# Patient Record
Sex: FEMALE | Race: WHITE | NOT HISPANIC OR LATINO | Employment: OTHER | URBAN - METROPOLITAN AREA
[De-identification: names, ages, dates, MRNs, and addresses within clinical notes are randomized per-mention and may not be internally consistent; named-entity substitution may affect disease eponyms.]

---

## 2023-08-20 LAB
ANION GAP IN SER/PLAS: 15 MMOL/L (ref 10–20)
BASOPHILS (10*3/UL) IN BLOOD BY AUTOMATED COUNT: 0.07 X10E9/L (ref 0–0.1)
BASOPHILS/100 LEUKOCYTES IN BLOOD BY AUTOMATED COUNT: 1.1 % (ref 0–2)
CALCIUM (MG/DL) IN SER/PLAS: 8.8 MG/DL (ref 8.6–10.3)
CARBON DIOXIDE, TOTAL (MMOL/L) IN SER/PLAS: 25 MMOL/L (ref 21–32)
CHLORIDE (MMOL/L) IN SER/PLAS: 102 MMOL/L (ref 98–107)
CREATININE (MG/DL) IN SER/PLAS: 0.96 MG/DL (ref 0.5–1.05)
EOSINOPHILS (10*3/UL) IN BLOOD BY AUTOMATED COUNT: 0.31 X10E9/L (ref 0–0.4)
EOSINOPHILS/100 LEUKOCYTES IN BLOOD BY AUTOMATED COUNT: 4.9 % (ref 0–6)
ERYTHROCYTE DISTRIBUTION WIDTH (RATIO) BY AUTOMATED COUNT: 14.8 % (ref 11.5–14.5)
ERYTHROCYTE MEAN CORPUSCULAR HEMOGLOBIN CONCENTRATION (G/DL) BY AUTOMATED: 30.5 G/DL (ref 32–36)
ERYTHROCYTE MEAN CORPUSCULAR VOLUME (FL) BY AUTOMATED COUNT: 87 FL (ref 80–100)
ERYTHROCYTES (10*6/UL) IN BLOOD BY AUTOMATED COUNT: 5.11 X10E12/L (ref 4–5.2)
GFR FEMALE: 60 ML/MIN/1.73M2
GLUCOSE (MG/DL) IN SER/PLAS: 99 MG/DL (ref 74–99)
HEMATOCRIT (%) IN BLOOD BY AUTOMATED COUNT: 44.3 % (ref 36–46)
HEMOGLOBIN (G/DL) IN BLOOD: 13.5 G/DL (ref 12–16)
IMMATURE GRANULOCYTES/100 LEUKOCYTES IN BLOOD BY AUTOMATED COUNT: 0.6 % (ref 0–0.9)
LEUKOCYTES (10*3/UL) IN BLOOD BY AUTOMATED COUNT: 6.3 X10E9/L (ref 4.4–11.3)
LYMPHOCYTES (10*3/UL) IN BLOOD BY AUTOMATED COUNT: 2.26 X10E9/L (ref 0.8–3)
LYMPHOCYTES/100 LEUKOCYTES IN BLOOD BY AUTOMATED COUNT: 35.7 % (ref 13–44)
MONOCYTES (10*3/UL) IN BLOOD BY AUTOMATED COUNT: 0.56 X10E9/L (ref 0.05–0.8)
MONOCYTES/100 LEUKOCYTES IN BLOOD BY AUTOMATED COUNT: 8.8 % (ref 2–10)
NEUTROPHILS (10*3/UL) IN BLOOD BY AUTOMATED COUNT: 3.09 X10E9/L (ref 1.6–5.5)
NEUTROPHILS/100 LEUKOCYTES IN BLOOD BY AUTOMATED COUNT: 48.9 % (ref 40–80)
PLATELETS (10*3/UL) IN BLOOD AUTOMATED COUNT: 382 X10E9/L (ref 150–450)
POTASSIUM (MMOL/L) IN SER/PLAS: 3.6 MMOL/L (ref 3.5–5.3)
SODIUM (MMOL/L) IN SER/PLAS: 138 MMOL/L (ref 136–145)
UREA NITROGEN (MG/DL) IN SER/PLAS: 13 MG/DL (ref 6–23)

## 2023-08-23 ENCOUNTER — HOSPITAL ENCOUNTER (OUTPATIENT)
Dept: DATA CONVERSION | Facility: HOSPITAL | Age: 83
Discharge: HOME | End: 2023-08-23
Payer: MEDICARE

## 2023-08-23 DIAGNOSIS — G30.9 ALZHEIMER'S DISEASE, UNSPECIFIED (CODE) (MULTI): ICD-10-CM

## 2023-08-23 DIAGNOSIS — S00.03XA CONTUSION OF SCALP, INITIAL ENCOUNTER: ICD-10-CM

## 2023-08-23 DIAGNOSIS — Z23 ENCOUNTER FOR IMMUNIZATION: ICD-10-CM

## 2023-08-23 DIAGNOSIS — S61.511A LACERATION WITHOUT FOREIGN BODY OF RIGHT WRIST, INITIAL ENCOUNTER: ICD-10-CM

## 2023-08-23 DIAGNOSIS — S09.90XA UNSPECIFIED INJURY OF HEAD, INITIAL ENCOUNTER: ICD-10-CM

## 2023-08-23 DIAGNOSIS — S00.81XA ABRASION OF OTHER PART OF HEAD, INITIAL ENCOUNTER: ICD-10-CM
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
## 2023-08-23 DIAGNOSIS — F02.80 DEMENTIA IN OTHER DISEASES CLASSIFIED ELSEWHERE, UNSPECIFIED SEVERITY, WITHOUT BEHAVIORAL DISTURBANCE, PSYCHOTIC DISTURBANCE, MOOD DISTURBANCE, AND ANXIETY (MULTI): ICD-10-CM

## 2023-08-23 LAB
ALBUMIN SERPL-MCNC: 3.8 GM/DL (ref 3.5–5)
ALBUMIN/GLOB SERPL: 1.6 RATIO (ref 1.5–3)
ALP BLD-CCNC: 129 U/L (ref 35–125)
ALT SERPL-CCNC: 30 U/L (ref 5–40)
ANION GAP SERPL CALCULATED.3IONS-SCNC: 11 MMOL/L (ref 0–19)
AST SERPL-CCNC: 92 U/L (ref 5–40)
BACTERIA UR QL AUTO: NEGATIVE
BASOPHILS # BLD AUTO: 0.06 K/UL (ref 0–0.22)
BASOPHILS NFR BLD AUTO: 1.1 % (ref 0–1)
BILIRUB SERPL-MCNC: 0.4 MG/DL (ref 0.1–1.2)
BILIRUB UR QL STRIP.AUTO: NEGATIVE
BUN SERPL-MCNC: 13 MG/DL (ref 8–25)
BUN/CREAT SERPL: 16.3 RATIO (ref 8–21)
CALCIUM SERPL-MCNC: 9.2 MG/DL (ref 8.5–10.4)
CHLORIDE SERPL-SCNC: 104 MMOL/L (ref 97–107)
CK SERPL-CCNC: 50 U/L (ref 24–195)
CLARITY UR: CLEAR
CO2 SERPL-SCNC: 25 MMOL/L (ref 24–31)
COLOR UR: NORMAL
CREAT SERPL-MCNC: 0.8 MG/DL (ref 0.4–1.6)
DEPRECATED RDW RBC AUTO: 43.1 FL (ref 37–54)
DIFFERENTIAL METHOD BLD: ABNORMAL
EOSINOPHIL # BLD AUTO: 0.29 K/UL (ref 0–0.45)
EOSINOPHIL NFR BLD: 5.1 % (ref 0–3)
ERYTHROCYTE [DISTWIDTH] IN BLOOD BY AUTOMATED COUNT: 14.5 % (ref 11.7–15)
GFR SERPL CREATININE-BSD FRML MDRD: 74 ML/MIN/1.73 M2
GLOBULIN SER-MCNC: 2.4 G/DL (ref 1.9–3.7)
GLUCOSE SERPL-MCNC: 94 MG/DL (ref 65–99)
GLUCOSE UR STRIP.AUTO-MCNC: NEGATIVE MG/DL
HCT VFR BLD AUTO: 39.5 % (ref 36–44)
HGB BLD-MCNC: 12.9 GM/DL (ref 12–15)
HGB UR QL STRIP.AUTO: NORMAL /HPF (ref 0–3)
HGB UR QL: NEGATIVE
HS TROPONIN T DELTA: 1 (ref 0–4)
HS TROPONIN T DELTA: ABNORMAL (ref 0–4)
HYALINE CASTS UR QL AUTO: NORMAL /LPF
IMM GRANULOCYTES # BLD AUTO: 0.01 K/UL (ref 0–0.1)
KETONES UR QL STRIP.AUTO: NEGATIVE
LEUKOCYTE ESTERASE UR QL STRIP.AUTO: NEGATIVE
LYMPHOCYTES # BLD AUTO: 1.81 K/UL (ref 1.2–3.2)
LYMPHOCYTES NFR BLD MANUAL: 31.8 % (ref 20–40)
MCH RBC QN AUTO: 26.7 PG (ref 26–34)
MCHC RBC AUTO-ENTMCNC: 32.7 % (ref 31–37)
MCV RBC AUTO: 81.8 FL (ref 80–100)
MICROSCOPIC (UA): NORMAL
MONOCYTES # BLD AUTO: 0.48 K/UL (ref 0–0.8)
MONOCYTES NFR BLD MANUAL: 8.4 % (ref 0–8)
NEUTROPHILS # BLD AUTO: 3.05 K/UL
NEUTROPHILS # BLD AUTO: 3.05 K/UL (ref 1.8–7.7)
NEUTROPHILS.IMMATURE NFR BLD: 0.2 % (ref 0–1)
NEUTS SEG NFR BLD: 53.4 % (ref 50–70)
NITRITE UR QL STRIP.AUTO: NEGATIVE
NRBC BLD-RTO: 0 /100 WBC
PH UR STRIP.AUTO: 6.5 [PH] (ref 4.6–8)
PLATELET # BLD AUTO: 344 K/UL (ref 150–450)
PMV BLD AUTO: 8.3 CU (ref 7–12.6)
POTASSIUM SERPL-SCNC: 3.7 MMOL/L (ref 3.4–5.1)
PROT SERPL-MCNC: 6.2 G/DL (ref 5.9–7.9)
PROT UR STRIP.AUTO-MCNC: NEGATIVE MG/DL
RBC # BLD AUTO: 4.83 M/UL (ref 4–4.9)
SODIUM SERPL-SCNC: 140 MMOL/L (ref 133–145)
SP GR UR STRIP.AUTO: 1.01 (ref 1–1.03)
SQUAMOUS UR QL AUTO: NORMAL /HPF
TROPONIN T SERPL-MCNC: 19 NG/L
TROPONIN T SERPL-MCNC: 20 NG/L
URINE CULTURE: NORMAL
UROBILINOGEN UR QL STRIP.AUTO: NORMAL MG/DL (ref 0–1)
WBC # BLD AUTO: 5.7 K/UL (ref 4.5–11)
WBC #/AREA URNS AUTO: 1 /HPF (ref 0–3)

## 2024-05-30 ENCOUNTER — EVALUATION (OUTPATIENT)
Dept: SPEECH THERAPY | Facility: HOSPITAL | Age: 84
End: 2024-05-30
Payer: MEDICARE

## 2024-05-30 DIAGNOSIS — R13.10 DYSPHAGIA, UNSPECIFIED TYPE: Primary | ICD-10-CM

## 2024-05-30 DIAGNOSIS — R41.841 COGNITIVE COMMUNICATION DEFICIT: ICD-10-CM

## 2024-05-30 DIAGNOSIS — I63.9 CEREBRAL INFARCTION, UNSPECIFIED (MULTI): ICD-10-CM

## 2024-05-30 PROCEDURE — 92610 EVALUATE SWALLOWING FUNCTION: CPT | Mod: GN

## 2024-05-30 ASSESSMENT — PAIN - FUNCTIONAL ASSESSMENT: PAIN_FUNCTIONAL_ASSESSMENT: 0-10

## 2024-05-30 ASSESSMENT — PAIN SCALES - GENERAL: PAINLEVEL_OUTOF10: 3

## 2024-05-30 ASSESSMENT — ENCOUNTER SYMPTOMS
DEPRESSION: 1
LOSS OF SENSATION IN FEET: 0
OCCASIONAL FEELINGS OF UNSTEADINESS: 1

## 2024-05-30 NOTE — LETTER
May 30, 2024    India Rhodes MD  [address] [place] [address]    Patient: Kate Kaye   YOB: 1940   Date of Visit: 5/30/2024       Dear No referring provider defined for this encounter.    The attached plan of care is being sent to you because your patient’s medical reimbursement requires that you certify the plan of care. Your signature is required to allow uninterrupted insurance coverage.      You may indicate your approval by signing below and faxing this form back to us at Dept Fax: [phone].    Please call Dept: [phone] with any questions or concerns.    Thank you for this referral,        MALU Stein  [address] [address] [phone]    Payer: Payor: MEDICARE / Plan: MEDICARE PART A AND B / Product Type: *No Product type* /                                                                         Date:     Dear MALU Stein,     Re: Ms. Kate Kaye, MRN:[id number]    I certify that I have reviewed the attached plan of care and it is medically necessary for Ms. Kate Kaye (1940) who is under my care.          ______________________________________                    _________________  Provider name and credentials                                           Date and time                                                                                           Plan of Care 5/30/24   Effective from: 5/30/2024  Effective to: 8/28/2024    Plan ID: [id number]                Participants as of Finalize on 5/30/2024      Name Type Comments Contact Info    MALU Stein Speech Language Pathologist  [phone]    India Rhodes MD Consulting Physician  [phone]           Last Plan Note       Author: Giselle E Dina, SLP Status: Incomplete Last edited: 5/30/2024  2:30 PM         Speech-Language Pathology    SLP Adult Outpatient Speech-Language Pathology Clinical Swallow Evaluation    Patient  Name: Kate Kaye  MRN: [id number]  Today's Date: 5/30/2024      Time Calculation  Start Time: 1415  Stop Time: 1500  Time Calculation (min): 45 min      Current Problem:   1. Dysphagia, unspecified type  Follow Up In Speech Therapy      2. Cerebral infarction, unspecified (Multi)  Follow Up In Speech Therapy      3. Cognitive communication deficit  Follow Up In Speech Therapy          Recommendations:  Solid consistency: Soft/bite-sized (IDDSI level 6) for fruits/veggies/misc. and Minced/moist (IDDSI level 5) for meats  Liquid consistency: Thin (IDDSI 0)  Medication administration: Crushed, in puree, (verify with pharmacy/physician)  Compensatory swallow strategies: Seated upright - as close to 90 degrees as possible, Remain upright for 20-30 minutes after meals, Full supervision during meals, 1:1 assistance with meals, Alternate solids and liquids, Single sips, Small bites/sips, Eat/feed slowly, and Stringent oral care routine - 2-3x/day (before/after meals)  MBSS: Yes    Assessment:  Pt presents with mild oral dysphagia and suspected pharyngeal dysphagia upon completion of CSE. Given suspected laryngeal dysfunction and airway compromise, pt is at a low  risk of developing pulmonary complications associated with suspected aspiration given the identified risk factors and prognostic factors. If nursing notes increase in s/s of aspiration, temperature spikes, or increase in O2 requirements, recommend downgrade to NPO until SLP can further evaluate.   Prognosis: Excellent    Plan:   Skilled speech therapy services are indicated to provide training and instruction regarding the use of compensatory swallow strategies, to complete oropharyngeal strengthening exercises, for pt/caregiver education in order to reduce risk of aspiration, dehydration and malnutrition. Pt would also benefit from cognitive-linguistic evaluation.   Inpatient/Swing Bed or Outpatient: Outpatient  SLP Frequency: 1x per week  Duration: Current  admission  Discussed POC: Patient, Caregiver/family, Nursing, Physician  Discussed Risks/Benefits: Yes, Patient, Caregiver/Family, Nursing, Physician  Patient/Caregiver Agreeable: Yes   ST OK to Discharge: No     Impressions:  Current Diet: Mechanical Soft per caregiver description; Thin (IDDSI 0)  Temperature: afebrile  Respiratory Status: Room Air; No recent hx of PNA per pt and caregiver  Cranial Nerve Exam: CNV d/t decrease jaw strength  Oral [facility] Exam: Dentition: , Natural/Adequate, Mucosa: , pink/moist, free of obvious lesions, and Adequate oral health/hygiene  [place] Swallow Protocol: Fail - immediate cough, watery eyes  Textures Administered: 3 0z Water - [place] Swallow, Sips Water, Pudding, and Hugo Cracker/Ujliet Doone   Clinical Observations: Oral phase - No anterior loss of liquids or solids. Noted impulsive rate and large bolus size with thin liquids via straw. Pt required SLP occlusion of straw in order to achieve appropriate bolus size for thin liquids. Noted mild delay in oral prep time for both pudding and regular solids. Noted min oral residuals with regular solids, requiring x2 liquid wash. Pharyngeal phase - Noted immediate cough with consecutive straw sips of thin liquids. no overt s/s of aspiration with single straw sips of thin liquids, pudding, or regular solids.    Risk factors for Aspiration PNA: Age (>70), Polypharmacy (>5), Modified Diet, Dependent for oral care, and Dependent for feeding  Prognostic factors that mitigate risk: Stable immune status, Stable respiratory status, and Adequate oral health/hygiene      Goals:   Long Term Goals:   Pt will maintain adequate hydration/nutrition with optimal safety and efficiency via PO intake on least restrictive diet without evidence of pulmonary compromise.  Start Date: 5/30/24  Goal Target Date: 8/30/24  Status:  Goal Established   Progress: CSE completed this date    Short Term Goals:   Pt caregivers will demonstrate understanding/carryover of  compensatory safe swallow strategies (slow rate, small bites/sips, alternating consistencies) in 90% of opportunities with min verbal cues.  Start Date: 5/30/24  Goal Target Date: 8/30/24  Status:  Goal Established   Progress: CSE completed this date    Pt will complete laryngeal/pharyngeal strengthening exercises with 75% acc with min verbal and visual cues in order to improve safety and efficiency of swallowing mechanism.  Start Date: 5/30/24  Goal Target Date: 8/30/24  Status:  Goal Established   Progress: CSE completed this date    Pt will participate in MBSS in order to further assess safety and efficiency of swallow and inform diet recommendations and POC.  Start Date: 5/30/24  Goal Target Date: 8/30/24  Status:  Goal Established   Progress: CSE completed this date    Pt will participate in cognitive linguistic assessment in order to further inform POC and facilitate independence and reduce caregiver burden.   Start Date: 5/30/24  Goal Target Date: 8/30/24  Status:  Goal Established   Progress: CSE completed this date      General Information:  Chief Complaint: Pt's caregiver reporting that pt has difficulty swallowing solids, liquids, and medications. Pt requires total feeding assistance and per caregiver is frequently exhibiting prolonged oral holding and pocketing behaviors. Pt and caregiver endorse that she frequently coughs when drinking liquids via straw.   Pt Status: Pt alert and oriented to person and place  Pt's goal for ST: To swallow better and communicate better.   SLP Received On: 05/30/24  Patient Class: Outpatient  Living Environment: Home, Live with family  Reason for Referral: Suspected oral/pharyngeal dysphagia, r/o aspiration, determine if MBSS needed  Referred By: India Rhodes MD  Past Medical History Relevant to Rehab: Parkinson's Disease, Dementia  BaseLine Diet: Regular/Thin  Current Diet : Mechanical Soft    Pain:   Pain Assessment: 0-10  Pain Score: 3   Pain Type: Chronic pain  "  Pain Location: Hip  Pain Orientation: Right                    Treatment  Treatment provided: No      Education:  Learner Patient and Family/Caregiver   Barriers to Learning Cognitive limitations Hearing problems   Method Written - \"Swallowing Guidelines and Recommendations\"; \"IDDSI 6/soft and bite-sized solids\"; \"IDDSI 5/minced and moist solids\"; \"Swallowing and Feeding Strategies for Dementia\"   Education - Topic ST provided patient education regarding role of ST, purpose of assessment, clinical impressions, goals of treatment, and plan of care. Provided handouts on recommend International Dysphagia Diet Standardization Initiative diet texture recommendations, as well as best practices for providing feeding assistance for pts with dementia. Patient verbalized questionable full comprehension, consistent with cognitive status. Education will be reinforced.      Outcome    Verbalized understanding, Verbalized agreement, Education Needs: , Continued instruction, Review/reinforcement, Education Goals: , and Partially meets                       Current Participants as of 5/30/2024      Name Type Comments Contact Info    India Rhodes MD Consulting Physician  [phone]    Signature pending    Giselle Arroyo, SLP Speech Language Pathologist  [phone]    Electronically signed by MALU Stein at 5/30/2024 1528 EDT        "

## 2024-05-30 NOTE — LETTER
May 30, 2024    India Rhodes MD  [address] [address]    Patient: Kate Kaye   YOB: 1940   Date of Visit: 5/30/2024       Dear No referring provider defined for this encounter.    The attached plan of care is being sent to you because your patient’s medical reimbursement requires that you certify the plan of care. Your signature is required to allow uninterrupted insurance coverage.      You may indicate your approval by signing below and faxing this form back to us at Dept Fax: [phone].    Please call Dept: [phone] with any questions or concerns.    Thank you for this referral,        MALU Stein  [address] [address] [phone]    Payer: Payor: MEDICARE / Plan: MEDICARE PART A AND B / Product Type: *No Product type* /                                                                         Date:     Dear MALU Stein,     Re: Ms. Kate Kaye, MRN:[id number]    I certify that I have reviewed the attached plan of care and it is medically necessary for Ms. Kate Kaye (1940) who is under my care.          ______________________________________                    _________________  Provider name and credentials                                           Date and time                                                                                           Plan of Care 5/30/24   Effective from: 5/30/2024  Effective to: 8/28/2024    Plan ID: [id number]            Participants as of Finalize on 5/30/2024    Name Type Comments Contact Info    MALU Stein Speech Language Pathologist  [phone]    India Rhodes MD Consulting Physician  [phone]       Last Plan Note     Author: Giselle E Dina, SLP Status: Incomplete Last edited: 5/30/2024  2:30 PM       Speech-Language Pathology    SLP Adult Outpatient Speech-Language Pathology Clinical Swallow Evaluation    Patient Name: Kate BRITT  Vargas  MRN: [id number]  Today's Date: 5/30/2024      Time Calculation  Start Time: 1415  Stop Time: 1500  Time Calculation (min): 45 min      Current Problem:   1. Dysphagia, unspecified type  Follow Up In Speech Therapy      2. Cerebral infarction, unspecified (Multi)  Follow Up In Speech Therapy      3. Cognitive communication deficit  Follow Up In Speech Therapy          Recommendations:  Solid consistency: Soft/bite-sized (IDDSI level 6) for fruits/veggies/misc. and Minced/moist (IDDSI level 5) for meats  Liquid consistency: Thin (IDDSI 0)  Medication administration: Crushed, in puree, (verify with pharmacy/physician)  Compensatory swallow strategies: Seated upright - as close to 90 degrees as possible, Remain upright for 20-30 minutes after meals, Full supervision during meals, 1:1 assistance with meals, Alternate solids and liquids, Single sips, Small bites/sips, Eat/feed slowly, and Stringent oral care routine - 2-3x/day (before/after meals)  MBSS: Yes    Assessment:  Pt presents with mild oral dysphagia and suspected pharyngeal dysphagia upon completion of CSE. Given suspected laryngeal dysfunction and airway compromise, pt is at a low  risk of developing pulmonary complications associated with suspected aspiration given the identified risk factors and prognostic factors. If nursing notes increase in s/s of aspiration, temperature spikes, or increase in O2 requirements, recommend downgrade to NPO until SLP can further evaluate.   Prognosis: Excellent    Plan:   Skilled speech therapy services are indicated to provide training and instruction regarding the use of compensatory swallow strategies, to complete oropharyngeal strengthening exercises, for pt/caregiver education in order to reduce risk of aspiration, dehydration and malnutrition. Pt would also benefit from cognitive-linguistic evaluation.   Inpatient/Swing Bed or Outpatient: Outpatient  SLP Frequency: 1x per week  Duration: Current admission  Discussed  POC: Patient, Caregiver/family, Nursing, Physician  Discussed Risks/Benefits: Yes, Patient, Caregiver/Family, Nursing, Physician  Patient/Caregiver Agreeable: Yes   ST OK to Discharge: No     Impressions:  Current Diet: Mechanical Soft per caregiver description; Thin (IDDSI 0)  Temperature: afebrile  Respiratory Status: Room Air; No recent hx of PNA per pt and caregiver  Cranial Nerve Exam: CNV d/t decrease jaw strength  [facility] Exam: Dentition: , Natural/Adequate, Mucosa: , pink/moist, free of obvious lesions, and Adequate oral health/hygiene  [place] Swallow Protocol: Fail - immediate cough, watery eyes  Textures Administered: 3 0z Water - [place] Swallow, Sips Water, Pudding, and Hugo Cracker/Juliet Doone   Clinical Observations: Oral phase - No anterior loss of liquids or solids. Noted impulsive rate and large bolus size with thin liquids via straw. Pt required SLP occlusion of straw in order to achieve appropriate bolus size for thin liquids. Noted mild delay in oral prep time for both pudding and regular solids. Noted min oral residuals with regular solids, requiring x2 liquid wash. Pharyngeal phase - Noted immediate cough with consecutive straw sips of thin liquids. no overt s/s of aspiration with single straw sips of thin liquids, pudding, or regular solids.    Risk factors for Aspiration PNA: Age (>70), Polypharmacy (>5), Modified Diet, Dependent for oral care, and Dependent for feeding  Prognostic factors that mitigate risk: Stable immune status, Stable respiratory status, and Adequate oral health/hygiene      Goals:   Long Term Goals:   Pt will maintain adequate hydration/nutrition with optimal safety and efficiency via PO intake on least restrictive diet without evidence of pulmonary compromise.  Start Date: 5/30/24  Goal Target Date: 8/30/24  Status:  Goal Established   Progress: CSE completed this date    Short Term Goals:   Pt caregivers will demonstrate understanding/carryover of compensatory safe  swallow strategies (slow rate, small bites/sips, alternating consistencies) in 90% of opportunities with min verbal cues.  Start Date: 5/30/24  Goal Target Date: 8/30/24  Status:  Goal Established   Progress: CSE completed this date    Pt will complete laryngeal/pharyngeal strengthening exercises with 75% acc with min verbal and visual cues in order to improve safety and efficiency of swallowing mechanism.  Start Date: 5/30/24  Goal Target Date: 8/30/24  Status:  Goal Established   Progress: CSE completed this date    Pt will participate in MBSS in order to further assess safety and efficiency of swallow and inform diet recommendations and POC.  Start Date: 5/30/24  Goal Target Date: 8/30/24  Status:  Goal Established   Progress: CSE completed this date    Pt will participate in cognitive linguistic assessment in order to further inform POC and facilitate independence and reduce caregiver burden.   Start Date: 5/30/24  Goal Target Date: 8/30/24  Status:  Goal Established   Progress: CSE completed this date      General Information:  Chief Complaint: Pt's caregiver reporting that pt has difficulty swallowing solids, liquids, and medications. Pt requires total feeding assistance and per caregiver is frequently exhibiting prolonged oral holding and pocketing behaviors. Pt and caregiver endorse that she frequently coughs when drinking liquids via straw.   Pt Status: Pt alert and oriented to person and place  Pt's goal for ST: To swallow better and communicate better.   SLP Received On: 05/30/24  Patient Class: Outpatient  Living Environment: Home, Live with family  Reason for Referral: Suspected oral/pharyngeal dysphagia, r/o aspiration, determine if MBSS needed  Referred By: India Rhodes MD  Past Medical History Relevant to Rehab: Parkinson's Disease, Dementia  [place] Diet: Regular/Thin  Current Diet : Mechanical Soft    Pain:   Pain Assessment: 0-10  Pain Score: 3   Pain Type: Chronic pain   Pain Location:  "Hip  Pain Orientation: Right                    Treatment  Treatment provided: No      Education:  Learner Patient and Family/Caregiver   Barriers to Learning Cognitive limitations Hearing problems   Method Written - \"Swallowing Guidelines and Recommendations\"; \"IDDSI 6/soft and bite-sized solids\"; \"IDDSI 5/minced and moist solids\"; \"Swallowing and Feeding Strategies for Dementia\"   Education - Topic ST provided patient education regarding role of ST, purpose of assessment, clinical impressions, goals of treatment, and plan of care. Provided handouts on recommend International Dysphagia Diet Standardization Initiative diet texture recommendations, as well as best practices for providing feeding assistance for pts with dementia. Patient verbalized questionable full comprehension, consistent with cognitive status. Education will be reinforced.      Outcome    Verbalized understanding, Verbalized agreement, Education Needs: , Continued instruction, Review/reinforcement, Education Goals: , and Partially meets                       Current Participants as of 5/30/2024    Name Type Comments Contact Info    India Rhodes MD Consulting Physician  [phone]    Signature pending    Giselle Arroyo, SLP Speech Language Pathologist  [phone]    Electronically signed by MALU Stein at 5/30/2024 1528 EDT      "

## 2024-05-30 NOTE — PROGRESS NOTES
Speech-Language Pathology    SLP Adult Outpatient Speech-Language Pathology Clinical Swallow Evaluation    Patient Name: Kate Kaye  MRN: [id number]  Today's Date: 5/30/2024      Time Calculation  Start Time: 1415  Stop Time: 1500  Time Calculation (min): 45 min      Current Problem:   1. Dysphagia, unspecified type  Follow Up In Speech Therapy      2. Cerebral infarction, unspecified (Multi)  Follow Up In Speech Therapy      3. Cognitive communication deficit  Follow Up In Speech Therapy          Recommendations:  Solid consistency: Soft/bite-sized (IDDSI level 6) for fruits/veggies/misc. and Minced/moist (IDDSI level 5) for meats  Liquid consistency: Thin (IDDSI 0)  Medication administration: Crushed, in puree, (verify with pharmacy/physician)  Compensatory swallow strategies: Seated upright - as close to 90 degrees as possible, Remain upright for 20-30 minutes after meals, Full supervision during meals, 1:1 assistance with meals, Alternate solids and liquids, Single sips, Small bites/sips, Eat/feed slowly, and Stringent oral care routine - 2-3x/day (before/after meals)  MBSS: Yes    Assessment:  Pt presents with mild oral dysphagia and suspected pharyngeal dysphagia upon completion of CSE. Given suspected laryngeal dysfunction and airway compromise, pt is at a low  risk of developing pulmonary complications associated with suspected aspiration given the identified risk factors and prognostic factors. If nursing notes increase in s/s of aspiration, temperature spikes, or increase in O2 requirements, recommend downgrade to NPO until SLP can further evaluate.   Prognosis: Excellent    Plan:   Skilled speech therapy services are indicated to provide training and instruction regarding the use of compensatory swallow strategies, to complete oropharyngeal strengthening exercises, for pt/caregiver education in order to reduce risk of aspiration, dehydration and malnutrition. Pt would also benefit from  cognitive-linguistic evaluation.   Inpatient/Swing Bed or Outpatient: Outpatient  SLP Frequency: 1x per week  Duration: Current admission  Discussed POC: Patient, Caregiver/family, Nursing, Physician  Discussed Risks/Benefits: Yes, Patient, Caregiver/Family, Nursing, Physician  Patient/Caregiver Agreeable: Yes   ST OK to Discharge: No     Impressions:  Current Diet: Mechanical Soft per caregiver description; Thin (IDDSI 0)  Temperature: afebrile  Respiratory Status: Room Air; No recent hx of PNA per pt and caregiver  Cranial Nerve Exam: CNV d/t decrease jaw strength  [facility]: Dentition: , Natural/Adequate, Mucosa: , pink/moist, free of obvious lesions, and Adequate oral health/hygiene  Stephanie Swallow Protocol: Fail - immediate cough, watery eyes  Textures Administered: 3 0z Water - [place] Swallow, Sips Water, Pudding, and Hugo Cracker/Juliet Doone   Clinical Observations: Oral phase - No anterior loss of liquids or solids. Noted impulsive rate and large bolus size with thin liquids via straw. Pt required SLP occlusion of straw in order to achieve appropriate bolus size for thin liquids. Noted mild delay in oral prep time for both pudding and regular solids. Noted min oral residuals with regular solids, requiring x2 liquid wash. Pharyngeal phase - Noted immediate cough with consecutive straw sips of thin liquids. no overt s/s of aspiration with single straw sips of thin liquids, pudding, or regular solids.    Risk factors for Aspiration PNA: Age (>70), Polypharmacy (>5), Modified Diet, Dependent for oral care, and Dependent for feeding  Prognostic factors that mitigate risk: Stable immune status, Stable respiratory status, and Adequate oral health/hygiene      Goals:   Long Term Goals:   Pt will maintain adequate hydration/nutrition with optimal safety and efficiency via PO intake on least restrictive diet without evidence of pulmonary compromise.  Start Date: 5/30/24  Goal Target Date: 8/30/24  Status:  Goal  Established   Progress: CSE completed this date    Short Term Goals:   Pt caregivers will demonstrate understanding/carryover of compensatory safe swallow strategies (slow rate, small bites/sips, alternating consistencies) in 90% of opportunities with min verbal cues.  Start Date: 5/30/24  Goal Target Date: 8/30/24  Status:  Goal Established   Progress: CSE completed this date    Pt will complete laryngeal/pharyngeal strengthening exercises with 75% acc with min verbal and visual cues in order to improve safety and efficiency of swallowing mechanism.  Start Date: 5/30/24  Goal Target Date: 8/30/24  Status:  Goal Established   Progress: CSE completed this date    Pt will participate in MBSS in order to further assess safety and efficiency of swallow and inform diet recommendations and POC.  Start Date: 5/30/24  Goal Target Date: 8/30/24  Status:  Goal Established   Progress: CSE completed this date    Pt will participate in cognitive linguistic assessment in order to further inform POC and facilitate independence and reduce caregiver burden.   Start Date: 5/30/24  Goal Target Date: 8/30/24  Status:  Goal Established   Progress: CSE completed this date      General Information:  Chief Complaint: Pt's caregiver reporting that pt has difficulty swallowing solids, liquids, and medications. Pt requires total feeding assistance and per caregiver is frequently exhibiting prolonged oral holding and pocketing behaviors. Pt and caregiver endorse that she frequently coughs when drinking liquids via straw.   Pt Status: Pt alert and oriented to person and place  Pt's goal for ST: To swallow better and communicate better.   SLP Received On: 05/30/24  Patient Class: Outpatient  Living Environment: Home, Live with family  Reason for Referral: Suspected oral/pharyngeal dysphagia, r/o aspiration, determine if MBSS needed  Referred By: India Rhodes MD  Past Medical History Relevant to Rehab: Parkinson's Disease, Dementia  [employer]  "Diet: Regular/Thin  Current Diet : Mechanical Soft    Pain:   Pain Assessment: 0-10  Pain Score: 3   Pain Type: Chronic pain   Pain Location: Hip  Pain Orientation: Right                    Treatment  Treatment provided: No      Education:  Learner Patient and Family/Caregiver   Barriers to Learning Cognitive limitations Hearing problems   Method Written - \"Swallowing Guidelines and Recommendations\"; \"IDDSI 6/soft and bite-sized solids\"; \"IDDSI 5/minced and moist solids\"; \"Swallowing and Feeding Strategies for Dementia\"   Education - Topic ST provided patient education regarding role of ST, purpose of assessment, clinical impressions, goals of treatment, and plan of care. Provided handouts on recommend International Dysphagia Diet Standardization Initiative diet texture recommendations, as well as best practices for providing feeding assistance for pts with dementia. Patient verbalized questionable full comprehension, consistent with cognitive status. Education will be reinforced.      Outcome    Verbalized understanding, Verbalized agreement, Education Needs: , Continued instruction, Review/reinforcement, Education Goals: , and Partially meets                "

## 2024-06-06 ENCOUNTER — TREATMENT (OUTPATIENT)
Dept: SPEECH THERAPY | Facility: HOSPITAL | Age: 84
End: 2024-06-06
Payer: MEDICARE

## 2024-06-06 DIAGNOSIS — R41.841 COGNITIVE COMMUNICATION DEFICIT: ICD-10-CM

## 2024-06-06 DIAGNOSIS — R13.10 DYSPHAGIA, UNSPECIFIED TYPE: Primary | ICD-10-CM

## 2024-06-06 DIAGNOSIS — I63.9 CEREBRAL INFARCTION, UNSPECIFIED (MULTI): ICD-10-CM

## 2024-06-06 PROCEDURE — 92507 TX SP LANG VOICE COMM INDIV: CPT | Mod: GN

## 2024-06-06 ASSESSMENT — PAIN - FUNCTIONAL ASSESSMENT: PAIN_FUNCTIONAL_ASSESSMENT: 0-10

## 2024-06-06 ASSESSMENT — PAIN SCALES - GENERAL: PAINLEVEL_OUTOF10: 0 - NO PAIN

## 2024-06-06 NOTE — PROGRESS NOTES
Speech-Language Pathology    SLP Adult Outpatient Speech-Language Pathology Treatment     Patient Name: Kate Kaye  MRN: [id number]  Today's Date: 6/6/2024   Time Calculation  Start Time: 1430  Stop Time: 1515  Time Calculation (min): 45 min            Current Problem:   1. Dysphagia, unspecified type  Follow Up In Speech Therapy      2. Cerebral infarction, unspecified (Multi)  Follow Up In Speech Therapy      3. Cognitive communication deficit  Follow Up In Speech Therapy            Plan:   Inpatient/Swing Bed or Outpatient: Outpatient  SLP TX Plan: Continue Plan of Care  SLP Plan: Skilled SLP  SLP Frequency: 1x per week  Duration: 12 weeks  Next Treatment Priority: compensatory attention/memory strategies  Discussed POC: Caregiver/family  Discussed Risks/Benefits: Yes  Patient/Caregiver Agreeable: Yes   SLP - OK to Discharge: No    Assessment:   SLP TX Intervention Outcome: Making Progress Towards Goals  Prognosis: Excellent  Treatment Provided: Yes    Cognitive Linguistic Quick Test (CLQT) completed this date; pt presents with severe cognitive-linguistic deficits impacting the following cognitive-linguistic domains: attention, memory, executive function, language, and visuospatial skills (see Outcome Measures section below).  Pt would benefit from skilled ST services in order to continue addressing identified cognitive-linguistic deficits including  verbal expression, auditory comprehension, attention, and memory skills. Primary focus will be caregiver education to help facilitate use of cognitive-linguistic compensatory strategies in the home setting. Without skilled ST services, pt is at risk for continued and more severe cognitive-linguistic deficits, increased caregiver burden, and decreased independence with ADLs.     Treatment Tolerance: Patient tolerated treatment well      Barriers: None  Education Provided: Yes        Objective:  Long Term Goals:   Pt will demonstrate improved verbal expression skills  (SEV to MOD) in order to participate in complex conversation in variety of social settings as evidenced by improved score on CLQT.  Start Date: 6/6/24  Goal Target Date: 9/6/24  Status: goal established  Progress: CLQT completed this date    Pt will demonstrate improved auditory comprehension skills  (SEV to MOD)  as evidenced by improved score on CLQT in order to increase fx communication skills and participation in social settings.  Start Date: 6/6/24  Goal Target Date: 9/6/24  Status: goal established  Progress: CLQT completed this date     Pt will demonstrate improved memory skills  (SEV to MOD) as evidenced by improved score on CLQT in order to increase pt completion of IADLS and learning of new information.  Start Date: 6/6/24  Goal Target Date: 9/6/24  Status: goal established  Progress: CLQT completed this date    Pt will maintain adequate hydration/nutrition with optimal safety and efficiency via PO intake on least restrictive diet without evidence of pulmonary compromise.  Start Date: 5/30/24  Goal Target Date: 8/30/24  Status:  Goal Established   Progress: CSE completed this date     Short Term Goals:   Pt caregivers will demonstrate understanding/carryover of compensatory safe swallow strategies (slow rate, small bites/sips, alternating consistencies) in 90% of opportunities with min verbal cues.  Start Date: 5/30/24  Goal Target Date: 8/30/24  Status: not addressed this date   Progress: n/a     Pt will complete laryngeal/pharyngeal strengthening exercises with 75% acc with min verbal and visual cues in order to improve safety and efficiency of swallowing mechanism.  Start Date: 5/30/24  Goal Target Date: 8/30/24  Status: not addressed this date   Progress: n/a     Pt will participate in MBSS in order to further assess safety and efficiency of swallow and inform diet recommendations and POC.  Start Date: 5/30/24  Goal Target Date: 8/30/24  Status:  not addressed this date   Progress: n/a     Pt will  participate in cognitive linguistic assessment in order to further inform POC and facilitate independence and reduce caregiver burden.   Start Date: 24  Goal Target Date: 24  Status:  GOAL MET  Progress: CLQT completed this date    Pt will complete simple word finding tasks with 90% acc with min-mod verbal cues from SLP/caregivers in order to improve verbal expression.  Start Date: 24  Goal Target Date: 24  Status: goal established  Progress: CLQT completed this date    Pt will complete simple auditory comprehension tasks with 85% accuracy with min-mod verbal cues in order to improve fx communication in social settings.  Start Date: 24  Goal Target Date: 24  Status: goal established  Progress: CLQT completed this date    Pt will complete tasks targeting memory skills including sustained/alternating attention, working memory, thought organization, and short-term memory with 85% acc (I) in order to improve encoding of newly learned information, promote independence, and return to PLOF.  Start Date: 24  Goal Target Date: 24  Status: goal established  Progress: CLQT completed this date      SLP Outcome Measures:   Pt was administered the Cognitive Linguistic Quick Test (CLQT) in order to further assess cognitive-linguistic functioning following acquired brain injury (CVA). The CLQT provides and overall assessment of five cognitive domains (attention, memory, language, executive function, and visuospatial skills); and identifies strengths and weaknesses. The assessment yields a severity rating for the five cognitive domains and a Total Composite Severity Rating. Pt's performance is outlined below:   Domain Score/Ratings:   Attention - 3/SEVERE  Memory - 45/SEVERE  Executive Function - 1/SEVERE  Language - 15/SEVERE  Visuospatial Skills - 4/SEVERE  Clock Drawing - 0/SEVERE  Compositive Severity Ratin/SEVERE      Subjective:   Pt Status: Pt pleasantly distracted this date. Noted  impulsivity, requiring continuous redirection from SLP and caregiver.   Home Program Status: N/A  SLP Most Recent Visit  SLP Received On: 06/06/24  General  Referred By: Carmelo  Past Medical History Relevant to Rehab: Parkinson's Disease, Dementia  Patient Seen During This Visit: Yes  Arrival: Accompanied by: caregiver  Certification Period Start Date: 05/30/24  Certification Period End Date: 05/30/24  Number of Authorized Treatments : 12  Total Number of Visits : 1      Pain:   Pain Assessment: 0-10  Pain Score: 0 - No pain       Education  Outpatient Education:  Learner Patient and Family/Caregiver   Barriers to Learning Cognitive limitations Communication limitations   Method Verbal and Demonstration   Education - Topic ST provided patient caregiver education regarding pt's progress towards cognitive linguistic goals     Outcome    Verbalized understanding, Verbalized agreement, Education Needs: , Continued instruction, Review/reinforcement, Education Goals: , and Partially meets

## 2024-06-13 ENCOUNTER — TREATMENT (OUTPATIENT)
Dept: SPEECH THERAPY | Facility: HOSPITAL | Age: 84
End: 2024-06-13
Payer: MEDICARE

## 2024-06-13 DIAGNOSIS — R41.841 COGNITIVE COMMUNICATION DEFICIT: ICD-10-CM

## 2024-06-13 DIAGNOSIS — I63.9 CEREBRAL INFARCTION, UNSPECIFIED (MULTI): ICD-10-CM

## 2024-06-13 DIAGNOSIS — R13.10 DYSPHAGIA, UNSPECIFIED TYPE: Primary | ICD-10-CM

## 2024-06-13 PROCEDURE — 92507 TX SP LANG VOICE COMM INDIV: CPT | Mod: GN

## 2024-06-13 PROCEDURE — 92526 ORAL FUNCTION THERAPY: CPT | Mod: GN

## 2024-06-13 ASSESSMENT — PAIN - FUNCTIONAL ASSESSMENT: PAIN_FUNCTIONAL_ASSESSMENT: 0-10

## 2024-06-13 ASSESSMENT — PAIN SCALES - GENERAL: PAINLEVEL_OUTOF10: 0 - NO PAIN

## 2024-06-13 NOTE — PROGRESS NOTES
Speech-Language Pathology    SLP Adult Outpatient Speech-Language Pathology Treatment     Patient Name: Kate Kaye  MRN: [id number]  Today's Date: 6/13/2024     Time Calculation  Start Time: 1125  Stop Time: 1205  Time Calculation (min): 40 min      Current Problem:   1. Dysphagia, unspecified type  Follow Up In Speech Therapy    FL modified barium swallow study      2. Cerebral infarction, unspecified (Multi)  Follow Up In Speech Therapy      3. Cognitive communication deficit  Follow Up In Speech Therapy          Plan:   Inpatient/Swing Bed or Outpatient: Outpatient  SLP TX Plan: Continue Plan of Care  SLP Plan: Skilled SLP  SLP Frequency: 1x per week  Duration: 12 weeks  Next Treatment Priority: RMST/EMST  Discussed POC: Caregiver/family  Discussed Risks/Benefits: Yes  Patient/Caregiver Agreeable: Yes   SLP - OK to Discharge: No    Assessment:   SLP TX Intervention Outcome: Making Progress Towards Goals  Prognosis: Excellent  Treatment Provided: Yes     Pt and caregiver education targeted this date d/t presence of daughter and . Reviewed results of both CSE and cognitive-linguistic evaluation completed over the course of first two sessions. Clarified family's primary goals of treatment, adding motor speech and RMST goals to the POC. Provided verbal and written education for improved swallowing/feeding safety and improved speech intelligibility.     Treatment Tolerance: Patient tolerated treatment well      Barriers: None  Education Provided: Yes        Objective:  Long Term Goals:   Pt will demonstrate improved verbal expression skills (SEV to MOD) in order to participate in complex conversation in variety of social settings as evidenced by improved score on CLQT.  Start Date: 6/6/24  Goal Target Date: 9/6/24  Status: not addressed this date   Progress: n/a     Pt will demonstrate improved auditory comprehension skills  (SEV to MOD)  as evidenced by improved score on CLQT in order to increase fx  communication skills and participation in social settings.  Start Date: 6/6/24  Goal Target Date: 9/6/24  Status: not addressed this date   Progress: n/a     Pt will demonstrate improved memory skills  (SEV to MOD) as evidenced by improved score on CLQT in order to increase pt completion of IADLS and learning of new information.  Start Date: 6/6/24  Goal Target Date: 9/6/24  Status: not addressed this date   Progress: n/a     Pt will maintain adequate hydration/nutrition with optimal safety and efficiency via PO intake on least restrictive diet without evidence of pulmonary compromise.  Start Date: 5/30/24  Goal Target Date: 8/30/24  Status: not addressed this date   Progress: n/a     Short Term Goals:   Pt caregivers will demonstrate understanding/carryover of compensatory safe swallow strategies (slow rate, small bites/sips, alternating consistencies) in 90% of opportunities with min verbal cues.  Start Date: 5/30/24  Goal Target Date: 8/30/24  Status: progressing towards goals   Progress: Pt's daughter and  verbalized understanding of compensatory strategies for feeding in order to promote safety and prevent choking/aspiration.      Pt will complete laryngeal/pharyngeal strengthening exercises with 75% acc with min verbal and visual cues in order to improve safety and efficiency of swallowing mechanism.  Start Date: 5/30/24  Goal Target Date: 8/30/24  Status: not addressed this date   Progress: n/a     Pt will participate in MBSS in order to further assess safety and efficiency of swallow and inform diet recommendations and POC.  Start Date: 5/30/24  Goal Target Date: 8/30/24  Status:  not addressed this date   Progress: Provided education to  and daughter that order has been placed and to call central scheduling phone number for scheduling with radiology at [facility]     Pt will participate in cognitive linguistic assessment in order to further inform POC and facilitate independence and reduce  caregiver burden.   Start Date: 5/30/24  Goal Target Date: 8/30/24  Status:  GOAL MET  Progress: CLQT completed this date     Pt will complete simple word finding tasks with 90% acc with min-mod verbal cues from SLP/caregivers in order to improve verbal expression.  Start Date: 6/6/24  Goal Target Date: 9/6/24  Status: not addressed this date   Progress: n/a     Pt will complete simple auditory comprehension tasks with 85% accuracy with min-mod verbal cues in order to improve fx communication in social settings.  Start Date: 6/6/24  Goal Target Date: 9/6/24  Status: not addressed this date   Progress: n/a     Pt will complete tasks targeting memory skills including sustained/alternating attention, working memory, thought organization, and short-term memory with 85% acc (I) in order to improve encoding of newly learned information, promote independence, and return to PLOF.  Start Date: 6/6/24  Goal Target Date: 9/6/24  Status: not addressed this date   Progress: n/a    Pt will demonstrate carryover of compensatory strategies for improved speech intelligibility in 85% of opportunities in order to improve functional communication with staff/caregivers.  Start Date: 6/13/24  Goal Target Date: 9/6/24  Status: goal established  Progress: provided training and education to patient, , and daughter on compensatory strategies for improved communication as well as speech intelligibility.      Pt will demonstrate independent completion of RMST exercises with 90% accuracy for form and protocol adherence in order to establish a home program for improved vocal strength, quality, and volume.   Start Date: 6/13/24  Goal Target Date: 9/6/24  Status: goal established  Progress: n/a       Subjective:   Pt Status: Pt pleasant and engaged. Pt's  and daughter present for session.   Home Program Status: n/a  SLP Most Recent Visit  SLP Received On: 06/13/24  General  Referred By: Carmelo  Past Medical History Relevant to Rehab:  Parkinson's Disease, Dementia  Patient Seen During This Visit: Yes  Arrival: Accompanied by: __  Certification Period Start Date: 05/30/24  Certification Period End Date: 05/30/24  Number of Authorized Treatments : 12  Total Number of Visits : 2      Pain:   Pain Assessment: 0-10  Pain Score: 0 - No pain       Education  Outpatient Education:  Learner Patient, Spouse, and Children   Barriers to Learning Cognitive limitations Hearing problems   Method Verbal, Written - Dysarthria/Communication Strategies, and Demonstration   Education - Topic ST provided patient caregiver education regarding role of ST, purpose of assessment, clinical impressions, goals of treatment, and plan of care. compensatory strategies for improved feeding/swallowing, speech intelligibility.      Outcome    Verbalized understanding, Verbalized agreement, Education Needs: , Continued instruction, Review/reinforcement, Education Goals: , and Partially meets

## 2024-06-20 ENCOUNTER — TREATMENT (OUTPATIENT)
Dept: SPEECH THERAPY | Facility: HOSPITAL | Age: 84
End: 2024-06-20
Payer: MEDICARE

## 2024-06-20 DIAGNOSIS — I63.9 CEREBRAL INFARCTION, UNSPECIFIED (MULTI): ICD-10-CM

## 2024-06-20 DIAGNOSIS — R13.10 DYSPHAGIA, UNSPECIFIED TYPE: ICD-10-CM

## 2024-06-20 DIAGNOSIS — R41.841 COGNITIVE COMMUNICATION DEFICIT: Primary | ICD-10-CM

## 2024-06-20 PROCEDURE — 92507 TX SP LANG VOICE COMM INDIV: CPT | Mod: GN

## 2024-06-20 ASSESSMENT — PAIN - FUNCTIONAL ASSESSMENT: PAIN_FUNCTIONAL_ASSESSMENT: 0-10

## 2024-06-20 ASSESSMENT — PAIN SCALES - GENERAL: PAINLEVEL_OUTOF10: 0 - NO PAIN

## 2024-06-20 NOTE — PROGRESS NOTES
Speech-Language Pathology    SLP Adult Outpatient Speech-Language Pathology Treatment     Patient Name: Kate Kaye  MRN: [id number]  Today's Date: 6/20/2024     Time Calculation  Start Time: 1430  Stop Time: 1515  Time Calculation (min): 45 min      Current Problem:   1. Cognitive communication deficit  Follow Up In Speech Therapy      2. Cerebral infarction, unspecified (Multi)  Follow Up In Speech Therapy      3. Dysphagia, unspecified type  Follow Up In Speech Therapy          Plan:   Inpatient/Swing Bed or Outpatient: Outpatient  SLP TX Plan: Continue Plan of Care  SLP Plan: Skilled SLP  SLP Frequency: 1x per week  Duration: 12 weeks     Discussed POC: Caregiver/family  Discussed Risks/Benefits: Yes  Patient/Caregiver Agreeable: Yes   SLP - OK to Discharge: No    Assessment:   SLP TX Intervention Outcome: Making Progress Towards Goals  Prognosis: Fair  Treatment Provided: Yes     Targeted improved speech intelligibility, reading comprehension and word finding with [facility] millie. Pt given instructions to select word from a field of 2 in order to complete the provided written phrase. Pt completed task with 30% accuracy independently. Performance improving to 100% accuracy with the following cues: reading phrase and answer choices aloud, repetition/rephrasing instructions. Pt demonstrated carryover of the following compensatory strategies when prompted to read phrases aloud: slow rate, over articulate, speak up. Pt's  also demonstrated understanding on speech intelligibility strategies when prompting patient.     Pt answered yes/no questions (categories, shape, color) given photo prompt. Pt answered yes/no questions with 70% accuracy; improving to 100% accuracy with mod verbal cues.       Treatment Tolerance: Patient tolerated treatment well      Barriers: None  Education Provided: Yes        Objective:  Long Term Goals:   Pt will demonstrate improved verbal expression skills (SEV to MOD) in  order to participate in complex conversation in variety of social settings as evidenced by improved score on CLQT.  Start Date: 6/6/24  Goal Target Date: 9/6/24  Status: progressing towards goals   Progress: n/a     Pt will demonstrate improved auditory comprehension skills  (SEV to MOD)  as evidenced by improved score on CLQT in order to increase fx communication skills and participation in social settings.  Start Date: 6/6/24  Goal Target Date: 9/6/24  Status: progressing towards goals   Progress: n/a     Pt will demonstrate improved memory skills  (SEV to MOD) as evidenced by improved score on CLQT in order to increase pt completion of IADLS and learning of new information.  Start Date: 6/6/24  Goal Target Date: 9/6/24  Status: not addressed this date   Progress: n/a     Pt will maintain adequate hydration/nutrition with optimal safety and efficiency via PO intake on least restrictive diet without evidence of pulmonary compromise.  Start Date: 5/30/24  Goal Target Date: 8/30/24  Status: not addressed this date   Progress: n/a     Short Term Goals:   Pt caregivers will demonstrate understanding/carryover of compensatory safe swallow strategies (slow rate, small bites/sips, alternating consistencies) in 90% of opportunities with min verbal cues.  Start Date: 5/30/24  Goal Target Date: 8/30/24  Status: progressing towards goals   Progress: Pt's daughter and  verbalized understanding of compensatory strategies for feeding in order to promote safety and prevent choking/aspiration.      Pt will complete laryngeal/pharyngeal strengthening exercises with 75% acc with min verbal and visual cues in order to improve safety and efficiency of swallowing mechanism.  Start Date: 5/30/24  Goal Target Date: 8/30/24  Status: not addressed this date   Progress: n/a     Pt will participate in MBSS in order to further assess safety and efficiency of swallow and inform diet recommendations and POC.  Start Date: 5/30/24  Goal  Target Date: 8/30/24  Status:  not addressed this date   Progress: Provided education to  and daughter that order has been placed and to call central scheduling phone number for scheduling with radiology at [facility]     Pt will participate in cognitive linguistic assessment in order to further inform POC and facilitate independence and reduce caregiver burden.   Start Date: 5/30/24  Goal Target Date: 8/30/24  Status:  GOAL MET  Progress: CLQT completed this date     Pt will complete simple word finding tasks with 90% acc with min-mod verbal cues from SLP/caregivers in order to improve verbal expression.  Start Date: 6/6/24  Goal Target Date: 9/6/24  Status: progressing towards goals   Progress:   Phrase completion - 3/10 (I) + 7 with max verbal cues     Pt will complete simple auditory comprehension tasks with 85% accuracy with min-mod verbal cues in order to improve fx communication in social settings.  Start Date: 6/6/24  Goal Target Date: 9/6/24  Status: progressing towards goals   Progress:      Pt will complete tasks targeting memory skills including sustained/alternating attention, working memory, thought organization, and short-term memory with 85% acc (I) in order to improve encoding of newly learned information, promote independence, and return to PLOF.  Start Date: 6/6/24  Goal Target Date: 9/6/24  Status: not addressed this date   Progress: n/a     Pt will demonstrate carryover of compensatory strategies for improved speech intelligibility in 85% of opportunities in order to improve functional communication with staff/caregivers.  Start Date: 6/13/24  Goal Target Date: 9/6/24  Status: progressing towards goals   Progress:   Oral reading of phrases: Pt requiring continuous verbal cues for carryover of high volume, slow rate, and over articulate. Pt's  cueing pt independently.   Previous:   provided training and education to patient, , and daughter on compensatory strategies for  improved communication as well as speech intelligibility.       Pt will demonstrate independent completion of RMST exercises with 90% accuracy for form and protocol adherence in order to establish a home program for improved vocal strength, quality, and volume.   Start Date: 6/13/24  Goal Target Date: 9/6/24  Status: not addressed this date   Progress: RMST [employer] ordered this date      Subjective:   Pt Status: Pt pleasant and occasionally distracted.   Home Program Status: n/a  SLP Most Recent Visit  SLP Received On: 06/20/24  General  Referred By: Carmelo  Past Medical History Relevant to Rehab: Parkinson's Disease, Dementia  Patient Seen During This Visit: Yes  Arrival: Accompanied by: caregiver and , Nayan  Certification Period Start Date: 05/30/24  Certification Period End Date: 05/30/24  Number of Authorized Treatments : 12  Total Number of Visits : 3      Pain:   Pain Assessment: 0-10  0-10 (Numeric) Pain Score: 0 - No pain       Education  Outpatient Education:  Learner Patient, Spouse, and Family/Caregiver   Barriers to Learning Cognitive limitations Communication limitations   Method Verbal and Demonstration   Education - Topic ST provided patient caregiver education regarding compensatory strategies for improved speech intelligibility, auditory comprehension, attention; pt's progress towards cognitive linguistic goals     Outcome    Verbalized understanding, Verbalized agreement, Education Needs: , Continued instruction, Review/reinforcement, Education Goals: , and Partially meets

## 2024-06-27 ENCOUNTER — APPOINTMENT (OUTPATIENT)
Dept: SPEECH THERAPY | Facility: HOSPITAL | Age: 84
End: 2024-06-27
Payer: MEDICARE

## 2024-07-02 ENCOUNTER — DOCUMENTATION (OUTPATIENT)
Dept: SPEECH THERAPY | Facility: HOSPITAL | Age: 84
End: 2024-07-02
Payer: MEDICARE

## 2024-07-02 NOTE — PROGRESS NOTES
Speech-Language Pathology                 Therapy Communication Note    Patient Name: Kate Kaye  MRN: [id number]  Today's Date: 7/2/2024     Discipline: Speech Language Pathology    Missed Visit Reason:  Pt no show this date. Pt is scheduled out for continued ST follow-up.     Missed Time: No Show

## 2024-07-08 ENCOUNTER — HOSPITAL ENCOUNTER (OUTPATIENT)
Dept: RADIOLOGY | Facility: HOSPITAL | Age: 84
Discharge: HOME | End: 2024-07-08
Payer: MEDICARE

## 2024-07-08 DIAGNOSIS — R13.10 DYSPHAGIA, UNSPECIFIED TYPE: ICD-10-CM

## 2024-07-08 PROCEDURE — 2500000005 HC RX 250 GENERAL PHARMACY W/O HCPCS: Performed by: FAMILY MEDICINE

## 2024-07-08 PROCEDURE — 92611 MOTION FLUOROSCOPY/SWALLOW: CPT | Mod: GN | Performed by: SPEECH-LANGUAGE PATHOLOGIST

## 2024-07-08 PROCEDURE — 3430000001 HC RX 343 DIAGNOSTIC RADIOPHARMACEUTICALS: Performed by: FAMILY MEDICINE

## 2024-07-08 PROCEDURE — 74230 X-RAY XM SWLNG FUNCJ C+: CPT | Performed by: RADIOLOGY

## 2024-07-08 PROCEDURE — 74230 X-RAY XM SWLNG FUNCJ C+: CPT

## 2024-07-08 NOTE — PROCEDURES
Speech-Language Pathology    SLP Adult Outpatient MBSS Evaluation    Patient Name: Kate Kaye  MRN: [id number]  Today's Date: 7/8/2024      Current Problem:   1. Dysphagia, unspecified type  FL modified barium swallow study    FL modified barium swallow study        MBSS completed. See combined radiologist and SLP report for complete study results.

## 2024-07-10 ENCOUNTER — EVALUATION (OUTPATIENT)
Dept: PHYSICAL THERAPY | Facility: HOSPITAL | Age: 84
End: 2024-07-10
Payer: MEDICARE

## 2024-07-10 DIAGNOSIS — M51.37 DEGENERATION OF LUMBAR OR LUMBOSACRAL INTERVERTEBRAL DISC: ICD-10-CM

## 2024-07-10 DIAGNOSIS — R29.6 FREQUENT FALLS: Primary | ICD-10-CM

## 2024-07-10 DIAGNOSIS — M50.30 DEGENERATION OF CERVICAL INTERVERTEBRAL DISC: ICD-10-CM

## 2024-07-10 DIAGNOSIS — R26.81 GAIT INSTABILITY: ICD-10-CM

## 2024-07-10 PROCEDURE — 97162 PT EVAL MOD COMPLEX 30 MIN: CPT | Mod: GP | Performed by: PHYSICAL THERAPIST

## 2024-07-10 ASSESSMENT — PATIENT HEALTH QUESTIONNAIRE - PHQ9
1. LITTLE INTEREST OR PLEASURE IN DOING THINGS: NOT AT ALL
SUM OF ALL RESPONSES TO PHQ9 QUESTIONS 1 AND 2: 0
2. FEELING DOWN, DEPRESSED OR HOPELESS: NOT AT ALL

## 2024-07-10 ASSESSMENT — PAIN DESCRIPTION - DESCRIPTORS: DESCRIPTORS: SORE

## 2024-07-10 ASSESSMENT — ACTIVITIES OF DAILY LIVING (ADL): ADL_ASSISTANCE: NEEDS ASSISTANCE

## 2024-07-10 ASSESSMENT — ENCOUNTER SYMPTOMS
LOSS OF SENSATION IN FEET: 0
DEPRESSION: 0
OCCASIONAL FEELINGS OF UNSTEADINESS: 1

## 2024-07-10 ASSESSMENT — COLUMBIA-SUICIDE SEVERITY RATING SCALE - C-SSRS
2. HAVE YOU ACTUALLY HAD ANY THOUGHTS OF KILLING YOURSELF?: NO
1. IN THE PAST MONTH, HAVE YOU WISHED YOU WERE DEAD OR WISHED YOU COULD GO TO SLEEP AND NOT WAKE UP?: NO
6. HAVE YOU EVER DONE ANYTHING, STARTED TO DO ANYTHING, OR PREPARED TO DO ANYTHING TO END YOUR LIFE?: NO

## 2024-07-10 ASSESSMENT — PAIN - FUNCTIONAL ASSESSMENT: PAIN_FUNCTIONAL_ASSESSMENT: 0-10

## 2024-07-10 NOTE — LETTER
July 10, 2024    Kristi Goldstein DO  [address] [place] [address]    Patient: Kate Kaye   YOB: 1940   Date of Visit: 7/10/2024       Dear Kristi Goldstein DO  [address] [place] [address]    The attached plan of care is being sent to you because your patient’s medical reimbursement requires that you certify the plan of care. Your signature is required to allow uninterrupted insurance coverage.      You may indicate your approval by signing below and faxing this form back to us at Dept Fax: [phone].    Please call Dept: [phone] with any questions or concerns.    Thank you for this referral,        Daniel Lombardo, PT  [address] [address]  [phone]    Payer: Payor: MEDICARE / Plan: MEDICARE PART A AND B / Product Type: *No Product type* /                                                                         Date:     Dear Daniel Lombardo, PT,     Re: Ms. Kate Kaye, MRN:[id number]    I certify that I have reviewed the attached plan of care and it is medically necessary for Ms. Kate Kaye (1940) who is under my care.          ______________________________________                    _________________  Provider name and credentials                                           Date and time                                                                                           Plan of Care 7/10/24   Effective from: 7/10/2024  Effective to: 8/14/2024    Plan ID: [id number]            Participants as of Finalize on 7/10/2024    Name Type Comments Contact Info    Kristi Goldstein DO Referring Provider  [phone]    Daniel Lombardo, PT Physical Therapist  [phone]       Last Plan Note     Author: Daniel Lombardo PT Status: Sign when Signing Visit Last edited: 7/10/2024  1:00 PM         Physical Therapy  Physical Therapy Evaluation and Treatment    Patient Name: Kate Kaye  MRN:  [id number]  Today's Date: 7/10/2024  Time Calculation  Start Time: 1301  Stop Time: 1345  Time Calculation (min): 44 min    Today's Charges  PT Evaluation Time Entry  PT Evaluation (Moderate) Time Entry: 44     Insurance:  Visit number: 1 of 5  Authorization info: no auth required  Payor: MEDICARE / Plan: MEDICARE PART A AND B / Product Type: *No Product type* /     Current Problem  1. Frequent falls  Referral to Physical Therapy    Follow Up In Physical Therapy      2. Degeneration of lumbar or lumbosacral intervertebral disc  Referral to Physical Therapy      3. Degeneration of cervical intervertebral disc  Referral to Physical Therapy      4. Gait instability  Referral to Physical Therapy    Follow Up In Physical Therapy        Problem List Items Addressed This Visit             ICD-10-CM    Frequent falls - Primary R29.6    Relevant Orders    Follow Up In Physical Therapy    Gait instability R26.81    Relevant Orders    Follow Up In Physical Therapy     Other Visit Diagnoses         Codes    Degeneration of lumbar or lumbosacral intervertebral disc     M51.37    Degeneration of cervical intervertebral disc     M50.30            General:  General  Reason for Referral: frequent falls  Referred By: Dr. Goldstein      Precautions:   Precautions  [facility] Fall Risk Score (The score of 4 or more indicates an increased risk of falling): 7  Medical Precautions: Fall precautions    Medical History Form: Reviewed (scanned into chart)    Subjective:   Subjective   Chief Complaint: Patient is an 83 year old female who presents to clinic with frequent falls. Patient's caregiver was present throughout the session to give a history. Patient is a poor historian. Patient was alert and oriented to her name only.  Onset Date: 7/8/2024  LYNDA: Chronic    Current Condition:   Worse    Pain:  Pain Assessment: 0-10  0-10 (Numeric) Pain Score:  (\"pretty good\")  Pain Location: Buttocks  Pain Orientation: Right, Left  Pain Descriptors: " Sore  Aggravating Factors:  Sitting  Relieving Factors:  Lying    Relevant Information (PMH & Previous Tests/Imaging): dementia, depression, anxiety  Previous Interventions/Treatments: Physical Therapy    Prior Level of Function (PLOF)  Patient previously independent with all ADLs  Exercise/Physical Activity: none, patient currently requires max A for transfers  Work/School: retired    Hand Dominance: right     Patients Living Environment: Reviewed and no concern  Home Living  Home Living Comment: Ramp to enter her house. Uses a wheelchair for all mobility. Is able to walk with 2 person assist. Patient is Max A for transfers.  Prior Function Per Pt/Caregiver Report  Level of [place]: Needs assistance with ADLs, Needs assistance with homemaking, Needs assistance with functional transfers  Receives Help From: Primary caregiver  ADL Assistance: Needs assistance  Homemaking Assistance: Needs assistance  Ambulatory Assistance: Needs assistance (wheelchair)  Primary Language: English    Patient's Goal(s) for Therapy: Be able to get stronger to help with transfers and gait if possible.    Red Flags: Do you have any of the following? No  Fever/chills, unexplained weight changes, dizziness/fainting, unexplained change in bowel or bladder functions, unexplained malaise or muscle weakness, night pain/sweats, numbness or tingling    Objective:  Objective     Hip PROM  R hip flexion: (125°): mod restriction  L hip flexion: (125°): WFL  R hip abduction: (45°): WFL  L hip abduction: (45°): WFL  R hip ER: (45°): WFL  L hip ER: (45°): WFL  R hip IR: (45°): min restriction  L hip IR: (45°): min restriction    Specific Lower Extremity MMT  R Iliopsoas: (5/5): 4/5  L Iliopsoas: (5/5): 4/5  R Gluteals (sidelying): (5/5): 3-/5  L Gluteals (sidelying): (5/5): 3-/5    Flexibility  R hamstrings: mod restriction  L hamstrings: mod restriction    Knee PROM  R knee flexion: (140°): WFL  L knee flexion: (140°): WFL  R knee extension: (0°):  -10  L knee extension: (0°): -12    Knee MMT  R knee flexion: (5/5): 4/5  L knee flexion: (5/5): 4/5  R knee extension: (5/5): 4/5  L knee extension: (5/5): 4/5    Ankle MMT  R ankle dorsiflexion: (5/5): 3+/5  L ankle dorsiflexion: (5/5): 3+/5  R ankle plantarflexion: (5/5): 4/5  L ankle plantarflexion: (5/5): 4/5      Posture: shoulder rounded forward, head forward, decreased lumbar lordosis, and kyphotic    Lower Extremity Functional Movements  Transfers: Maximal Assist  Gait:  not tested   Assistive Device  wheelchair  DL Squat: max A    Outcome Measures:  Other Measures  Other Outcome Measures: [facility] AM-PAC short form: 22     Treatment Performed:   none    EDUCATION:   Individual(s) Educated: patient and patient's caregiver  Education Provided: Home exercise program, plan of care, activity modifications, pain management, and injury pathology  Handout(s) Provided: Scanned into chart  Home Program: none  Risk and Benefits Discussed with Patient/Caregiver/Other: Yes   Patient/Caregiver Demonstrated Understanding: Yes   Plan of Care Discussed and Agreed Upon: Yes   Patient Response to Education: Patient/Caregiver verbalized understanding of information and Patient/Caregiver performed return demonstration of exercises/activities    Assessment: Patient presents with impaired strength, balance, range of motion/flexibility resulting in impaired ability to perform transfers and inability to perform at their prior level of function. Patient required Max A for sit<->stand and wheel chair <->mat table transfers. Patient demonstrated a posterior lean when standing. Patient demonstrated a tendency to freeze during stand-pivot transfers. Patient may benefit from physical therapy to address the impairments found & listed previously in the objective section in order to return to safe and pain-free ADLs and prior level of function.       Complexity: moderate    Plan:     Planned Interventions include: therapeutic exercise,  self-care home management, manual therapy, therapeutic activities, gait training, neuromuscular coordination, vasopneumatic, dry needling, aquatic therapy    Goals: Set and discussed today  Active       PT Problem       Patient will demonstrate improved hamstring flexibility in bilateral lower extremities WFL.       Start:  07/10/24    Expected End:  08/14/24            Patient will maintain wide base of support stand for at least 30 sec with CONTACT GUARD ASSIST.       Start:  07/10/24    Expected End:  08/14/24            Patient will be able to maintain dynamic seated balance during reaching to floor.       Start:  07/10/24    Expected End:  08/14/24            Patient will perform chair to and from bed transfer with minimal assist with handhold assistance from caregiver.       Start:  07/10/24    Expected End:  08/14/24            Patient will perform supine<->sit on bed with stand by assist demonstrating control       Start:  07/10/24    Expected End:  08/14/24            Patient will achieve bilateral knee ROM of  0-120 degrees        Start:  07/10/24    Expected End:  08/14/24            Patient will achieve bilateral hip flexion ROM WFL.       Start:  07/10/24    Expected End:  08/14/24            Patient will show a significant change in AM-PAC (22 to 32) patient reported outcome tool to demonstrate subjective imporovement       Start:  07/10/24    Expected End:  08/14/24                Plan of care was developed with input and agreement by the patient          Daneil Lombardo PT           Current Participants as of 7/10/2024    Name Type Comments Contact Info    Kristi Goldstein DO Referring Provider  [phone]    Signature pending    Daniel Lombardo, PT Physical Therapist  [phone]

## 2024-07-10 NOTE — PROGRESS NOTES
Physical Therapy  Physical Therapy Evaluation and Treatment    Patient Name: Kate Kaye  MRN: [id number]  Today's Date: 7/10/2024  Time Calculation  Start Time: 1301  Stop Time: 1345  Time Calculation (min): 44 min    Today's Charges  PT Evaluation Time Entry  PT Evaluation (Moderate) Time Entry: 44     Insurance:  Visit number: 1 of 5  Authorization info: no auth required  Payor: MEDICARE / Plan: MEDICARE PART A AND B / Product Type: *No Product type* /     Current Problem  1. Frequent falls  Referral to Physical Therapy    Follow Up In Physical Therapy      2. Degeneration of lumbar or lumbosacral intervertebral disc  Referral to Physical Therapy      3. Degeneration of cervical intervertebral disc  Referral to Physical Therapy      4. Gait instability  Referral to Physical Therapy    Follow Up In Physical Therapy        Problem List Items Addressed This Visit             ICD-10-CM    Frequent falls - Primary R29.6    Relevant Orders    Follow Up In Physical Therapy    Gait instability R26.81    Relevant Orders    Follow Up In Physical Therapy     Other Visit Diagnoses         Codes    Degeneration of lumbar or lumbosacral intervertebral disc     M51.37    Degeneration of cervical intervertebral disc     M50.30            General:  General  Reason for Referral: frequent falls  Referred By: Dr. Goldstein      Precautions:   Precautions  [facility] Fall Risk Score (The score of 4 or more indicates an increased risk of falling): 7  Medical Precautions: Fall precautions    Medical History Form: Reviewed (scanned into chart)    Subjective:   Subjective   Chief Complaint: Patient is an 83 year old female who presents to clinic with frequent falls. Patient's caregiver was present throughout the session to give a history. Patient is a poor historian. Patient was alert and oriented to her name only.  Onset Date: 7/8/2024  LYNDA: Chronic    Current Condition:   Worse    Pain:  Pain Assessment: 0-10  0-10 (Numeric) Pain  "Score:  (\"pretty good\")  Pain Location: Buttocks  Pain Orientation: Right, Left  Pain Descriptors: Sore  Aggravating Factors:  Sitting  Relieving Factors:  Lying    Relevant Information (PMH & Previous Tests/Imaging): dementia, depression, anxiety  Previous Interventions/Treatments: Physical Therapy    Prior Level of Function (PLOF)  Patient previously independent with all ADLs  Exercise/Physical Activity: none, patient currently requires max A for transfers  Work/School: retired    Hand Dominance: right     Patients Living Environment: Reviewed and no concern  Home Living  Home Living Comment: Ramp to enter her house. Uses a wheelchair for all mobility. Is able to walk with 2 person assist. Patient is Max A for transfers.  Prior Function Per Pt/Caregiver Report  Level of [place]: Needs assistance with ADLs, Needs assistance with homemaking, Needs assistance with functional transfers  Receives Help From: Primary caregiver  ADL Assistance: Needs assistance  Homemaking Assistance: Needs assistance  Ambulatory Assistance: Needs assistance (wheelchair)  Primary Language: English    Patient's Goal(s) for Therapy: Be able to get stronger to help with transfers and gait if possible.    Red Flags: Do you have any of the following? No  Fever/chills, unexplained weight changes, dizziness/fainting, unexplained change in bowel or bladder functions, unexplained malaise or muscle weakness, night pain/sweats, numbness or tingling    Objective:  Objective     Hip PROM  R hip flexion: (125°): mod restriction  L hip flexion: (125°): WFL  R hip abduction: (45°): WFL  L hip abduction: (45°): WFL  R hip ER: (45°): WFL  L hip ER: (45°): WFL  R hip IR: (45°): min restriction  L hip IR: (45°): min restriction    Specific Lower Extremity MMT  R Iliopsoas: (5/5): 4/5  L Iliopsoas: (5/5): 4/5  R Gluteals (sidelying): (5/5): 3-/5  L Gluteals (sidelying): (5/5): 3-/5    Flexibility  R hamstrings: mod restriction  L hamstrings: mod " restriction    Knee PROM  R knee flexion: (140°): WFL  L knee flexion: (140°): WFL  R knee extension: (0°): -10  L knee extension: (0°): -12    Knee MMT  R knee flexion: (5/5): 4/5  L knee flexion: (5/5): 4/5  R knee extension: (5/5): 4/5  L knee extension: (5/5): 4/5    Ankle MMT  R ankle dorsiflexion: (5/5): 3+/5  L ankle dorsiflexion: (5/5): 3+/5  R ankle plantarflexion: (5/5): 4/5  L ankle plantarflexion: (5/5): 4/5      Posture: shoulder rounded forward, head forward, decreased lumbar lordosis, and kyphotic    Lower Extremity Functional Movements  Transfers: Maximal Assist  Gait:  not tested   Assistive Device  wheelchair  DL Squat: max A    Outcome Measures:  Other Measures  Other Outcome Measures: [facility] short form: 22     Treatment Performed:   none    EDUCATION:   Individual(s) Educated: patient and patient's caregiver  Education Provided: Home exercise program, plan of care, activity modifications, pain management, and injury pathology  Handout(s) Provided: Scanned into chart  Home Program: none  Risk and Benefits Discussed with Patient/Caregiver/Other: Yes   Patient/Caregiver Demonstrated Understanding: Yes   Plan of Care Discussed and Agreed Upon: Yes   Patient Response to Education: Patient/Caregiver verbalized understanding of information and Patient/Caregiver performed return demonstration of exercises/activities    Assessment: Patient presents with impaired strength, balance, range of motion/flexibility resulting in impaired ability to perform transfers and inability to perform at their prior level of function. Patient required Max A for sit<->stand and wheel chair <->mat table transfers. Patient demonstrated a posterior lean when standing. Patient demonstrated a tendency to freeze during stand-pivot transfers. Patient may benefit from physical therapy to address the impairments found & listed previously in the objective section in order to return to safe and pain-free ADLs and prior  level of function.       Complexity: moderate    Plan:     Planned Interventions include: therapeutic exercise, self-care home management, manual therapy, therapeutic activities, gait training, neuromuscular coordination, vasopneumatic, dry needling, aquatic therapy    Goals: Set and discussed today  Active       PT Problem       Patient will demonstrate improved hamstring flexibility in bilateral lower extremities WFL.       Start:  07/10/24    Expected End:  08/14/24            Patient will maintain wide base of support stand for at least 30 sec with CONTACT GUARD ASSIST.       Start:  07/10/24    Expected End:  08/14/24            Patient will be able to maintain dynamic seated balance during reaching to floor.       Start:  07/10/24    Expected End:  08/14/24            Patient will perform chair to and from bed transfer with minimal assist with handhold assistance from caregiver.       Start:  07/10/24    Expected End:  08/14/24            Patient will perform supine<->sit on bed with stand by assist demonstrating control       Start:  07/10/24    Expected End:  08/14/24            Patient will achieve bilateral knee ROM of  0-120 degrees        Start:  07/10/24    Expected End:  08/14/24            Patient will achieve bilateral hip flexion ROM WFL.       Start:  07/10/24    Expected End:  08/14/24            Patient will show a significant change in AM-PAC (22 to 32) patient reported outcome tool to demonstrate subjective imporovement       Start:  07/10/24    Expected End:  08/14/24                Plan of care was developed with input and agreement by the patient          Daniel Lombardo, PT

## 2024-07-11 ENCOUNTER — TREATMENT (OUTPATIENT)
Dept: SPEECH THERAPY | Facility: HOSPITAL | Age: 84
End: 2024-07-11
Payer: MEDICARE

## 2024-07-11 DIAGNOSIS — R41.841 COGNITIVE COMMUNICATION DEFICIT: Primary | Chronic | ICD-10-CM

## 2024-07-11 DIAGNOSIS — I63.9 CEREBRAL INFARCTION, UNSPECIFIED (MULTI): Chronic | ICD-10-CM

## 2024-07-11 DIAGNOSIS — R13.10 DYSPHAGIA, UNSPECIFIED TYPE: Chronic | ICD-10-CM

## 2024-07-11 PROCEDURE — 92507 TX SP LANG VOICE COMM INDIV: CPT | Mod: GN

## 2024-07-11 PROCEDURE — 2700000080 HC SLP THE BREATHER RESP MUSCLE TRAINER

## 2024-07-11 ASSESSMENT — PAIN - FUNCTIONAL ASSESSMENT: PAIN_FUNCTIONAL_ASSESSMENT: 0-10

## 2024-07-11 ASSESSMENT — PAIN SCALES - GENERAL: PAINLEVEL_OUTOF10: 0 - NO PAIN

## 2024-07-11 NOTE — PROGRESS NOTES
"Speech-Language Pathology    SLP Adult Outpatient Speech-Language Pathology Treatment     Patient Name: Kate Kaye  MRN: [id number]  Today's Date: 7/11/2024     Time Calculation  Start Time: 1430  Stop Time: 1515  Time Calculation (min): 45 min      Current Problem:   1. Cognitive communication deficit  Follow Up In Speech Therapy      2. Cerebral infarction, unspecified (Multi)  Follow Up In Speech Therapy      3. Dysphagia, unspecified type  Follow Up In Speech Therapy          Plan:   Inpatient/Swing Bed or Outpatient: Outpatient  SLP TX Plan: Continue Plan of Care  SLP Plan: Skilled SLP  SLP Frequency: 1x per week  Duration: Other (Comment)     Discussed POC: Caregiver/family  Discussed Risks/Benefits: Yes  Patient/Caregiver Agreeable: Yes   SLP - OK to Discharge: No    Assessment:   SLP TX Intervention Outcome: Making Progress Towards Goals  Prognosis: Excellent  Treatment Provided: Yes    Targeted reading comprehension and picture ID with [employer] Comprehension Therapy millie. Pt given instructions to select picture from auto increasing field of choice that corresponds with written word. Pt Completed task with 76% accuracy independently. Pt performance improving to 100% accuracy with the following cues: written word.    ST provided training and education regarding respiratory muscle strengthening exercise program using \"The Breather.\" ST explained proper use/care of the device to both pt and caregiver. ST provided instruction on how to download and navigate the \"Breather \" millie in order to track daily performance and progress. Pt was instructed to complete 6 days a week, twice a day. Each session should include 2 sets of 10 reps (rep = 1 inhale/1 exhale). ST provided constructive feedback on pt use of breather device and diaphragmatic breathing technique. ST collaborated with pt to determine optimal resistance to begin training program, which was inhale setting of 3, and exhale setting of 3. Pt and caregiver " "verbalized and demonstrated understanding of training protocol. Pt completed 4 sets and reported that initial sets were easy but became challenging when settings were adjusted to inhale/exhale setting of 3. ST cautioned patient to take breaths and stop as needed. Patient reported not practicing at all out max, but confirmed a rating of 5-7 (\"in the zone\").       Treatment Tolerance: Patient tolerated treatment well      Barriers: None  Education Provided: Yes        Objective:  Long Term Goals:   Pt will demonstrate improved verbal expression skills (SEV to MOD) in order to participate in complex conversation in variety of social settings as evidenced by improved score on CLQT.  Start Date: 6/6/24  Goal Target Date: 9/6/24  Status: progressing towards goals   Progress: n/a     Pt will demonstrate improved auditory comprehension skills  (SEV to MOD)  as evidenced by improved score on CLQT in order to increase fx communication skills and participation in social settings.  Start Date: 6/6/24  Goal Target Date: 9/6/24  Status: progressing towards goals   Progress: n/a     Pt will demonstrate improved memory skills  (SEV to MOD) as evidenced by improved score on CLQT in order to increase pt completion of IADLS and learning of new information.  Start Date: 6/6/24  Goal Target Date: 9/6/24  Status: not addressed this date   Progress: n/a     Pt will maintain adequate hydration/nutrition with optimal safety and efficiency via PO intake on least restrictive diet without evidence of pulmonary compromise.  Start Date: 5/30/24  Goal Target Date: 8/30/24  Status: not addressed this date   Progress: n/a     Short Term Goals:   Pt caregivers will demonstrate understanding/carryover of compensatory safe swallow strategies (slow rate, small bites/sips, alternating consistencies) in 90% of opportunities with min verbal cues.  Start Date: 5/30/24  Goal Target Date: 8/30/24  Status: progressing towards goals   Progress: Pt's daughter " and  verbalized understanding of compensatory strategies for feeding in order to promote safety and prevent choking/aspiration.      Pt will complete laryngeal/pharyngeal strengthening exercises with 75% acc with min verbal and visual cues in order to improve safety and efficiency of swallowing mechanism.  Start Date: 5/30/24  Goal Target Date: 8/30/24  Status: not addressed this date   Progress: n/a     Pt will participate in MBSS in order to further assess safety and efficiency of swallow and inform diet recommendations and POC.  Start Date: 5/30/24  Goal Target Date: 8/30/24  Status:  goal met   Progress: MBSS completed on 7/8/24     Pt will complete simple word finding tasks with 90% acc with min-mod verbal cues from SLP/caregivers in order to improve verbal expression.  Start Date: 6/6/24  Goal Target Date: 9/6/24  Status: not addressed this date   Progress: n/a  Previous:  Phrase completion - 3/10 (I) + 7 with max verbal cues     Pt will complete simple auditory comprehension tasks with 85% accuracy with min-mod verbal cues in order to improve fx communication in social settings.  Start Date: 6/6/24  Goal Target Date: 9/6/24  Status: progressing towards goals   Progress: match picture to what you hear - 19/25 (I) + 6 mod vc     Pt will complete tasks targeting memory skills including sustained/alternating attention, working memory, thought organization, and short-term memory with 85% acc (I) in order to improve encoding of newly learned information, promote independence, and return to PLOF.  Start Date: 6/6/24  Goal Target Date: 9/6/24  Status: not addressed this date   Progress: n/a     Pt will demonstrate carryover of compensatory strategies for improved speech intelligibility in 85% of opportunities in order to improve functional communication with staff/caregivers.  Start Date: 6/13/24  Goal Target Date: 9/6/24  Status: not addressed this date   Progress: n/a  Previous:   Oral reading of phrases: Pt  requiring continuous verbal cues for carryover of high volume, slow rate, and over articulate. Pt's  cueing pt independently.   provided training and education to patient, , and daughter on compensatory strategies for improved communication as well as speech intelligibility.       Pt will demonstrate independent completion of RMST exercises with 90% accuracy for form and protocol adherence in order to establish a home program for improved vocal strength, quality, and volume.   Start Date: 6/13/24  Goal Target Date: 9/6/24  Status: progressing towards goals   Progress:   RMST The Breather: 4 sets of 10 level 3 exhale; level 3 inhale    SLP Outcome Measures:    MBSS results:   FINAL SPEECH RECOMMENDATIONS      Diet recommendations/feeding strategies:  Thin liquids (IDDSI - level 0)  Soft and bite sized solids (IDDSI - level 6) *Foods cut into bite  sized pieces (i.e., pinky nail sized bites), all foods soft enough to  be mashed with a fork, increased moisture      Follow safe swallowing strategies - slow intake rate, single liquid  sips only, swallow completely before taking the next bite/sip, dry  swallow every couple of bites/sips, sit at 90 degrees during oral  intake    Subjective:   Pt Status: Pt pleasant and engaged. MBSS completed on 7/8/24.   Home Program Status:   SLP Most Recent Visit  SLP Received On: 07/11/24  General  Referred By: Dr. Goldstein  Past Medical History Relevant to Rehab: Parkinson's Disease, Dementia  Patient Seen During This Visit: Yes  Arrival: Accompanied by: caregiver  Certification Period Start Date: 05/30/24  Certification Period End Date: 05/30/24  Number of Authorized Treatments : 12  Total Number of Visits : 4      Pain:   Pain Assessment: 0-10  0-10 (Numeric) Pain Score: 0 - No pain       Education  Outpatient Education:  Learner Patient and Family/Caregiver   Barriers to Learning Cognitive limitations Communication limitations   Method Verbal, Written - The  "Breather Pamphlet, and Demonstration   Education - Topic ST provided patient caregiver education regarding RMST \"The Breather\" protocol for home program     Outcome    Verbalized understanding, Verbalized agreement, Demonstrated understanding, Education Needs: , Continued instruction, Review/reinforcement, Education Goals: , and Partially meets                     "

## 2024-07-16 ENCOUNTER — APPOINTMENT (OUTPATIENT)
Dept: SPEECH THERAPY | Facility: HOSPITAL | Age: 84
End: 2024-07-16
Payer: MEDICARE

## 2024-07-16 ENCOUNTER — TREATMENT (OUTPATIENT)
Dept: PHYSICAL THERAPY | Facility: HOSPITAL | Age: 84
End: 2024-07-16
Payer: MEDICARE

## 2024-07-16 DIAGNOSIS — R29.6 FREQUENT FALLS: ICD-10-CM

## 2024-07-16 DIAGNOSIS — R41.841 COGNITIVE COMMUNICATION DEFICIT: Primary | Chronic | ICD-10-CM

## 2024-07-16 DIAGNOSIS — I63.9 CEREBRAL INFARCTION, UNSPECIFIED (MULTI): Chronic | ICD-10-CM

## 2024-07-16 DIAGNOSIS — R13.10 DYSPHAGIA, UNSPECIFIED TYPE: Chronic | ICD-10-CM

## 2024-07-16 DIAGNOSIS — R26.81 GAIT INSTABILITY: ICD-10-CM

## 2024-07-16 PROCEDURE — 92507 TX SP LANG VOICE COMM INDIV: CPT | Mod: GN

## 2024-07-16 PROCEDURE — 97110 THERAPEUTIC EXERCISES: CPT | Mod: GP,KX,CQ

## 2024-07-16 ASSESSMENT — PAIN SCALES - GENERAL
PAINLEVEL_OUTOF10: 0 - NO PAIN
PAINLEVEL_OUTOF10: 0 - NO PAIN

## 2024-07-16 ASSESSMENT — PAIN - FUNCTIONAL ASSESSMENT
PAIN_FUNCTIONAL_ASSESSMENT: 0-10
PAIN_FUNCTIONAL_ASSESSMENT: 0-10

## 2024-07-16 NOTE — PROGRESS NOTES
"Speech-Language Pathology    SLP Adult Outpatient Speech-Language Pathology Treatment     Patient Name: Kate Kaye  MRN: [id number]  Today's Date: 7/16/2024     Time Calculation  Start Time: 1430  Stop Time: 1515  Time Calculation (min): 45 min      Current Problem:   1. Cognitive communication deficit  Follow Up In Speech Therapy      2. Cerebral infarction, unspecified (Multi)  Follow Up In Speech Therapy      3. Dysphagia, unspecified type  Follow Up In Speech Therapy          Plan:   Inpatient/Swing Bed or Outpatient: Outpatient  SLP TX Plan: Continue Plan of Care  SLP Plan: Skilled SLP  SLP Frequency: 1x per week  Duration: 12 weeks     Discussed POC: Caregiver/family  Discussed Risks/Benefits: Yes  Patient/Caregiver Agreeable: Yes   SLP - OK to Discharge: No    Assessment:   SLP TX Intervention Outcome: Making Progress Towards Goals  Prognosis: Excellent  Treatment Provided: Yes     ST provided training and education regarding respiratory muscle strengthening exercise program using \"The Breather.\" ST explained proper use/care of the device to both pt, and caregiver. SLP reviewed instructions to complete 6 days a week, twice a day. Each session should include 2 sets of 10 reps (rep = 1 inhale/1 exhale). ST provided constructive feedback on pt use of breather device and diaphragmatic breathing technique. ST collaborated with pt to determine optimal resistance to begin training program, which was inhale setting of 3, and exhale setting of 3. Pt completed 4 sets of 10 repetitions (short break every 5 reps).     Targeted improved auditory comprehension with following directions task with Advanced Comprehension Therapy by [facility]. Pt given instructions to select pictures to match auditory directions (Levels 1-5; f/4-6 pictures). Pt completed task with 84% accuracy independently; improving to 100% accuracy when written directions were provided and moderate visual cues. Pt requested repetition of the auditory " stimulus on a total of 4/15 items.     Targeted reading comprehension and word finding with [employer] Reading Therapy millie. Pt given instructions to select word from a field of 2 in order to complete the provided written phrase; then read aloud with dysarthria strategies. Pt completed phrase completion task with 93% accuracy independently. Performance improving to 100% accuracy with the following cues: repetition; leading questions. Pt required intermittent verbal cues from SLP to increase volume with completing oral reading task.     Treatment Tolerance: Patient tolerated treatment well      Barriers: None  Education Provided: Yes        Objective:  Long Term Goals:   Pt will demonstrate improved verbal expression skills (SEV to MOD) in order to participate in complex conversation in variety of social settings as evidenced by improved score on CLQT.  Start Date: 6/6/24  Goal Target Date: 9/6/24  Status: progressing towards goals   Progress: n/a     Pt will demonstrate improved auditory comprehension skills  (SEV to MOD)  as evidenced by improved score on CLQT in order to increase fx communication skills and participation in social settings.  Start Date: 6/6/24  Goal Target Date: 9/6/24  Status: progressing towards goals   Progress: n/a     Pt will demonstrate improved memory skills  (SEV to MOD) as evidenced by improved score on CLQT in order to increase pt completion of IADLS and learning of new information.  Start Date: 6/6/24  Goal Target Date: 9/6/24  Status: not addressed this date   Progress: n/a     Pt will maintain adequate hydration/nutrition with optimal safety and efficiency via PO intake on least restrictive diet without evidence of pulmonary compromise.  Start Date: 5/30/24  Goal Target Date: 8/30/24  Status: not addressed this date   Progress: n/a     Short Term Goals:   Pt caregivers will demonstrate understanding/carryover of compensatory safe swallow strategies (slow rate, small bites/sips, alternating  consistencies) in 90% of opportunities with min verbal cues.  Start Date: 5/30/24  Goal Target Date: 8/30/24  Status: progressing towards goals   Progress: Pt's daughter and  verbalized understanding of compensatory strategies for feeding in order to promote safety and prevent choking/aspiration.      Pt will complete laryngeal/pharyngeal strengthening exercises with 75% acc with min verbal and visual cues in order to improve safety and efficiency of swallowing mechanism.  Start Date: 5/30/24  Goal Target Date: 8/30/24  Status: not addressed this date   Progress: n/a     Pt will participate in MBSS in order to further assess safety and efficiency of swallow and inform diet recommendations and POC.  Start Date: 5/30/24  Goal Target Date: 8/30/24  Status:  goal met   Progress: MBSS completed on 7/8/24     Pt will complete simple word finding tasks with 90% acc with min-mod verbal cues from SLP/caregivers in order to improve verbal expression.  Start Date: 6/6/24  Goal Target Date: 9/6/24  Status: progressing towards goals   Progress: Phrase completion - 14/15 (I) + 1 with min verbal cues  Previous:  Phrase completion - 3/10 (I) + 7 with max verbal cues     Pt will complete simple auditory comprehension tasks with 85% accuracy with min-mod verbal cues in order to improve fx communication in social settings.  Start Date: 6/6/24  Goal Target Date: 9/6/24  Status: progressing towards goals   Progress: 1-2 step directions: 1-step objects - 5/5; 1-step adjectives - 5/5; 1-step adj + object - 4/5; 2-step objects - 4/5; 2-step first/then - 3/5  Previous: match picture to what you hear - 19/25 (I) + 6 mod vc     Pt will complete tasks targeting memory skills including sustained/alternating attention, working memory, thought organization, and short-term memory with 85% acc (I) in order to improve encoding of newly learned information, promote independence, and return to PLOF.  Start Date: 6/6/24  Goal Target Date:  9/6/24  Status: not addressed this date   Progress: n/a     Pt will demonstrate carryover of compensatory strategies for improved speech intelligibility in 85% of opportunities in order to improve functional communication with staff/caregivers.  Start Date: 6/13/24  Goal Target Date: 9/6/24  Status: progressing towards goals   Progress: Oral reading of phrases: Pt requiring intermittent verbal cues for carryover of high volume  Previous:   Oral reading of phrases: Pt requiring continuous verbal cues for carryover of high volume, slow rate, and over articulate. Pt's  cueing pt independently.   provided training and education to patient, , and daughter on compensatory strategies for improved communication as well as speech intelligibility.       Pt will demonstrate independent completion of RMST exercises with 90% accuracy for form and protocol adherence in order to establish a home program for improved vocal strength, quality, and volume.   Start Date: 6/13/24  Goal Target Date: 9/6/24  Status: progressing towards goals   Progress: RMST The Breather: 4 sets of 10 level 3 exhale; level 3 inhale  Previous:   RMST The Breather: 4 sets of 10 level 3 exhale; level 3 inhale    Subjective:   Pt Status: Pt pleasant and engaged.   Home Program Status: Pt completed RMST The Breather protocol 1/6 days.   SLP Most Recent Visit  SLP Received On: 07/16/24  General  Referred By: Dr. Goldstein  Past Medical History Relevant to Rehab: Parkinson's Disease, Dementia  Patient Seen During This Visit: Yes  Arrival: Accompanied by: caregiver  Certification Period Start Date: 05/30/24  Certification Period End Date: 05/30/24  Number of Authorized Treatments : 12  Total Number of Visits : 5      Pain:   Pain Assessment: 0-10  0-10 (Numeric) Pain Score: 0 - No pain       Education  Outpatient Education:  Learner Patient and Family/Caregiver   Barriers to Learning Cognitive limitations   Method Verbal and Demonstration  "  Education - Topic ST provided patient caregiver education regarding compensatory strategies for improved completion of RMST \"The Breather\"; pt's progress towards cognitive linguistic goals     Outcome    Verbalized understanding, Verbalized agreement, Demonstrated understanding, Education Needs: , Continued instruction, Review/reinforcement, Education Goals: , and Partially meets                       "

## 2024-07-16 NOTE — PROGRESS NOTES
Physical Therapy Treatment    Patient Name: Kate Kaye  MRN: [id number]  Today's Date: 7/16/2024  Time Calculation  Start Time: 1516  Stop Time: 1602  Time Calculation (min): 46 min        PT Therapeutic Procedures Time Entry  Therapeutic Exercise Time Entry: 38  Therapeutic Activity Time Entry: 8                Current Problem  1. Frequent falls  Follow Up In Physical Therapy      2. Gait instability  Follow Up In Physical Therapy          General  Reason for Referral: frequent falls  Referred By: Dr. Goldstein  Past Medical History Relevant to Rehab: Parkinson's Disease, Dementia    Subjective   Current Condition:   Same  Patient reporting recent fall two days ago, when caregiver was not present.  No injury from fall per caregiver.    Performing HEP?: No    Precautions  Precautions  Medical Precautions: Fall precautions  Pain  Pain Assessment: 0-10  0-10 (Numeric) Pain Score: 0 - No pain    Objective           Treatments:    Therapeutic Exercise  Therapeutic Exercise Performed: Yes  Therapeutic Exercise Activity 1: H/L Hip Flexion x10  Therapeutic Exercise Activity 2: Iso Hip Add x10  Therapeutic Exercise Activity 3: H/L Hip ABD Red x15  Therapeutic Exercise Activity 4: Bridge x10  Therapeutic Exercise Activity 5: Posture correction x10  Therapeutic Exercise Activity 6: lateral trunk lean x5 R/L  Therapeutic Exercise Activity 7: Trunk flexion and extension x5 each with CGA  Therapeutic Exercise Activity 8: Standing in // bars 1-2' x2              Therapeutic Activity  Therapeutic Activity Performed: Yes  Therapeutic Activity 1: transfers, sit to stand and bed to wc, wc to bed with Max A x1              EDUCATION:   Individual(s) Educated: Patient/caregiver   Education Provided: transfers   Risk and Benefits Discussed with Patient/Caregiver/Other: Yes   Patient/Caregiver Demonstrated Understanding: Yes   Patient Response to Education: Patient/Caregiver verbalized understanding of information    Assessment:  Pt requires max tactile and vc's to correct seated posture, d/t sign significant flexed posture, with fair correction noted.  Pt was able to control trunk movements with only CGA for safety.  Pt demonstrates significant retro lean with transfers, with fair correction with cuing provided. Pt was able to  // bars with min/mod A to correct retro lean and improve alignment. Pt has tendency to retro lean and flex her knees in standing, with fair correction, however, poor carryover.  Attempted ambulation in the // bars however pt demonstrated significant retro lean and flexed posture, so held for now.    PT Assessment  PT Assessment Results: Decreased strength, Decreased range of motion, Decreased endurance, Impaired balance, Decreased mobility, Decreased cognition  Rehab Prognosis: Fair    Plan: Continue to progress current POC as tolerated to facilitate ability to perform functional activities.        Goals:  Active       PT Problem       Patient will demonstrate improved hamstring flexibility in bilateral lower extremities WFL.       Start:  07/10/24    Expected End:  08/14/24            Patient will maintain wide base of support stand for at least 30 sec with CONTACT GUARD ASSIST.       Start:  07/10/24    Expected End:  08/14/24            Patient will be able to maintain dynamic seated balance during reaching to floor.       Start:  07/10/24    Expected End:  08/14/24            Patient will perform chair to and from bed transfer with minimal assist with handhold assistance from caregiver.       Start:  07/10/24    Expected End:  08/14/24            Patient will perform supine<->sit on bed with stand by assist demonstrating control       Start:  07/10/24    Expected End:  08/14/24            Patient will achieve bilateral knee ROM of  0-120 degrees        Start:  07/10/24    Expected End:  08/14/24            Patient will achieve bilateral hip flexion ROM WFL.       Start:  07/10/24    Expected End:   08/14/24            Patient will show a significant change in AM-PAC (22 to 32) patient reported outcome tool to demonstrate subjective imporovement       Start:  07/10/24    Expected End:  08/14/24                 Landon Sotomayor PTA

## 2024-07-18 ENCOUNTER — APPOINTMENT (OUTPATIENT)
Dept: SPEECH THERAPY | Facility: HOSPITAL | Age: 84
End: 2024-07-18
Payer: MEDICARE

## 2024-07-25 ENCOUNTER — APPOINTMENT (OUTPATIENT)
Dept: CARDIOLOGY | Facility: HOSPITAL | Age: 84
End: 2024-07-25
Payer: MEDICARE

## 2024-07-25 ENCOUNTER — HOSPITAL ENCOUNTER (EMERGENCY)
Facility: HOSPITAL | Age: 84
Discharge: HOME | End: 2024-07-25
Attending: EMERGENCY MEDICINE
Payer: MEDICARE

## 2024-07-25 ENCOUNTER — APPOINTMENT (OUTPATIENT)
Dept: RADIOLOGY | Facility: HOSPITAL | Age: 84
End: 2024-07-25
Payer: MEDICARE

## 2024-07-25 ENCOUNTER — APPOINTMENT (OUTPATIENT)
Dept: SPEECH THERAPY | Facility: HOSPITAL | Age: 84
End: 2024-07-25
Payer: MEDICARE

## 2024-07-25 ENCOUNTER — DOCUMENTATION (OUTPATIENT)
Dept: PHYSICAL THERAPY | Facility: HOSPITAL | Age: 84
End: 2024-07-25
Payer: MEDICARE

## 2024-07-25 ENCOUNTER — APPOINTMENT (OUTPATIENT)
Dept: PHYSICAL THERAPY | Facility: HOSPITAL | Age: 84
End: 2024-07-25
Payer: MEDICARE

## 2024-07-25 ENCOUNTER — DOCUMENTATION (OUTPATIENT)
Dept: SPEECH THERAPY | Facility: HOSPITAL | Age: 84
End: 2024-07-25
Payer: MEDICARE

## 2024-07-25 VITALS
OXYGEN SATURATION: 96 % | BODY MASS INDEX: 18.16 KG/M2 | SYSTOLIC BLOOD PRESSURE: 122 MMHG | DIASTOLIC BLOOD PRESSURE: 61 MMHG | RESPIRATION RATE: 16 BRPM | TEMPERATURE: 97.4 F | HEIGHT: 65 IN | WEIGHT: 109 LBS | HEART RATE: 86 BPM

## 2024-07-25 DIAGNOSIS — N39.0 UTI (URINARY TRACT INFECTION), BACTERIAL: ICD-10-CM

## 2024-07-25 DIAGNOSIS — S20.312A ABRASION OF LEFT CHEST WALL, INITIAL ENCOUNTER: Primary | ICD-10-CM

## 2024-07-25 DIAGNOSIS — A49.9 UTI (URINARY TRACT INFECTION), BACTERIAL: ICD-10-CM

## 2024-07-25 DIAGNOSIS — S20.212A CONTUSION, CHEST WALL, LEFT, INITIAL ENCOUNTER: ICD-10-CM

## 2024-07-25 LAB
AMORPH CRY #/AREA UR COMP ASSIST: ABNORMAL /HPF
ANION GAP SERPL CALC-SCNC: 11 MMOL/L (ref 10–20)
APPEARANCE UR: ABNORMAL
BACTERIA #/AREA URNS AUTO: ABNORMAL /HPF
BASOPHILS # BLD AUTO: 0.08 X10*3/UL (ref 0–0.1)
BASOPHILS NFR BLD AUTO: 0.9 %
BILIRUB UR STRIP.AUTO-MCNC: NEGATIVE MG/DL
BUN SERPL-MCNC: 24 MG/DL (ref 6–23)
CALCIUM SERPL-MCNC: 8.8 MG/DL (ref 8.6–10.3)
CARDIAC TROPONIN I PNL SERPL HS: 6 NG/L (ref 0–13)
CHLORIDE SERPL-SCNC: 105 MMOL/L (ref 98–107)
CO2 SERPL-SCNC: 27 MMOL/L (ref 21–32)
COLOR UR: ABNORMAL
CREAT SERPL-MCNC: 0.89 MG/DL (ref 0.5–1.05)
EGFRCR SERPLBLD CKD-EPI 2021: 65 ML/MIN/1.73M*2
EOSINOPHIL # BLD AUTO: 0.1 X10*3/UL (ref 0–0.4)
EOSINOPHIL NFR BLD AUTO: 1.2 %
ERYTHROCYTE [DISTWIDTH] IN BLOOD BY AUTOMATED COUNT: 14.5 % (ref 11.5–14.5)
GLUCOSE SERPL-MCNC: 122 MG/DL (ref 74–99)
GLUCOSE UR STRIP.AUTO-MCNC: NORMAL MG/DL
HCT VFR BLD AUTO: 37.3 % (ref 36–46)
HGB BLD-MCNC: 11.7 G/DL (ref 12–16)
IMM GRANULOCYTES # BLD AUTO: 0.03 X10*3/UL (ref 0–0.5)
IMM GRANULOCYTES NFR BLD AUTO: 0.4 % (ref 0–0.9)
KETONES UR STRIP.AUTO-MCNC: ABNORMAL MG/DL
LEUKOCYTE ESTERASE UR QL STRIP.AUTO: ABNORMAL
LYMPHOCYTES # BLD AUTO: 1.51 X10*3/UL (ref 0.8–3)
LYMPHOCYTES NFR BLD AUTO: 17.7 %
MCH RBC QN AUTO: 27.5 PG (ref 26–34)
MCHC RBC AUTO-ENTMCNC: 31.4 G/DL (ref 32–36)
MCV RBC AUTO: 88 FL (ref 80–100)
MONOCYTES # BLD AUTO: 0.6 X10*3/UL (ref 0.05–0.8)
MONOCYTES NFR BLD AUTO: 7 %
NEUTROPHILS # BLD AUTO: 6.23 X10*3/UL (ref 1.6–5.5)
NEUTROPHILS NFR BLD AUTO: 72.8 %
NITRITE UR QL STRIP.AUTO: NEGATIVE
NRBC BLD-RTO: 0 /100 WBCS (ref 0–0)
PH UR STRIP.AUTO: 7.5 [PH]
PLATELET # BLD AUTO: 300 X10*3/UL (ref 150–450)
POTASSIUM SERPL-SCNC: 4 MMOL/L (ref 3.5–5.3)
PROT UR STRIP.AUTO-MCNC: ABNORMAL MG/DL
RBC # BLD AUTO: 4.26 X10*6/UL (ref 4–5.2)
RBC # UR STRIP.AUTO: NEGATIVE /UL
RBC #/AREA URNS AUTO: ABNORMAL /HPF
SODIUM SERPL-SCNC: 139 MMOL/L (ref 136–145)
SP GR UR STRIP.AUTO: 1.02
SQUAMOUS #/AREA URNS AUTO: ABNORMAL /HPF
UROBILINOGEN UR STRIP.AUTO-MCNC: ABNORMAL MG/DL
WBC # BLD AUTO: 8.6 X10*3/UL (ref 4.4–11.3)
WBC #/AREA URNS AUTO: ABNORMAL /HPF

## 2024-07-25 PROCEDURE — 93005 ELECTROCARDIOGRAM TRACING: CPT

## 2024-07-25 PROCEDURE — 99283 EMERGENCY DEPT VISIT LOW MDM: CPT

## 2024-07-25 PROCEDURE — 80051 ELECTROLYTE PANEL: CPT | Performed by: EMERGENCY MEDICINE

## 2024-07-25 PROCEDURE — 71101 X-RAY EXAM UNILAT RIBS/CHEST: CPT | Mod: LT

## 2024-07-25 PROCEDURE — 71101 X-RAY EXAM UNILAT RIBS/CHEST: CPT | Mod: LEFT SIDE | Performed by: RADIOLOGY

## 2024-07-25 PROCEDURE — 84484 ASSAY OF TROPONIN QUANT: CPT | Performed by: EMERGENCY MEDICINE

## 2024-07-25 PROCEDURE — 81003 URINALYSIS AUTO W/O SCOPE: CPT | Performed by: EMERGENCY MEDICINE

## 2024-07-25 PROCEDURE — 85025 COMPLETE CBC W/AUTO DIFF WBC: CPT | Performed by: EMERGENCY MEDICINE

## 2024-07-25 PROCEDURE — 36415 COLL VENOUS BLD VENIPUNCTURE: CPT | Performed by: EMERGENCY MEDICINE

## 2024-07-25 PROCEDURE — 2500000005 HC RX 250 GENERAL PHARMACY W/O HCPCS

## 2024-07-25 RX ORDER — LIDOCAINE 50 MG/G
1 PATCH TOPICAL DAILY
Qty: 7 PATCH | Refills: 0 | Status: SHIPPED | OUTPATIENT
Start: 2024-07-25 | End: 2024-08-01

## 2024-07-25 RX ORDER — LIDOCAINE 560 MG/1
1 PATCH PERCUTANEOUS; TOPICAL; TRANSDERMAL DAILY
Status: DISCONTINUED | OUTPATIENT
Start: 2024-07-25 | End: 2024-07-25 | Stop reason: HOSPADM

## 2024-07-25 RX ORDER — CEPHALEXIN 500 MG/1
500 CAPSULE ORAL 4 TIMES DAILY
Qty: 28 CAPSULE | Refills: 0 | Status: SHIPPED | OUTPATIENT
Start: 2024-07-25 | End: 2024-08-01

## 2024-07-25 RX ORDER — LIDOCAINE 560 MG/1
PATCH PERCUTANEOUS; TOPICAL; TRANSDERMAL
Status: DISCONTINUED
Start: 2024-07-25 | End: 2024-07-25 | Stop reason: HOSPADM

## 2024-07-25 RX ADMIN — LIDOCAINE 1 PATCH: 4 PATCH TOPICAL at 14:05

## 2024-07-25 RX ADMIN — LIDOCAINE 1 PATCH: 560 PATCH PERCUTANEOUS; TOPICAL; TRANSDERMAL at 14:05

## 2024-07-25 NOTE — ED PROVIDER NOTES
[facility]  ED  Provider Note  7/25/2024  1:49 PM  AC02/AC02              Chief Complaint   Patient presents with    Fall     Per caregiver pt was stuck between bedrail and bed during the night and has an abrasion to left ribs         History of Present Illness:   Kate Kaye is a 83 y.o. female presenting to the ED for left chest wall pain, beginning last night.  The complaint has been persistent, moderate in severity, and worsened by movement.  Patient was trapped between her bed rail of the bed last night while sleeping.  She has a small abrasion over left anterolateral chest wall and some tenderness over the ribs.  She has dementia and is a poor historian.  She was initially seen in the urgent care center and sent to the ED for further evaluation.  Further history is not available from the patient due to her dementia.  Her aide reports was trapped between the mattress and the bed rail per the night attendant.      Review of Systems:   Pertinent positives and review of systems as noted above.  Remaining 10 review of systems is negative or noncontributory to today's episode of care.  Review of Systems       --------------------------------------------- PAST HISTORY ---------------------------------------------  No past medical history on file.      has a past surgical history that includes MR angio neck wo IV contrast (7/1/2022); MR angio head wo IV contrast (7/1/2022); and CT angio abdomen pelvis w and or wo IV IV contrast (7/26/2023).         family history is not on file. Unless otherwise noted, family history is non contributory    Discharge Medication List as of 7/25/2024  3:15 PM        START taking these medications    Details   cephalexin (Keflex) 500 mg capsule Take 1 capsule (500 mg) by mouth 4 times a day for 7 days., Starting Thu 7/25/2024, Until Thu 8/1/2024, Print      lidocaine (Lidoderm) 5 % patch Place 1 patch over 12 hours on the skin once daily for 7 days. Remove & discard patch  within 12 hours or as directed by MD., Starting Thu 7/25/2024, Until Thu 8/1/2024, Print           CONTINUE these medications which have NOT CHANGED    Details   pantoprazole (ProtoNix) 40 mg EC tablet TAKE 1 TABLET BY MOUTH ONCE DAILY, Starting Sat 7/29/2023, Until Sun 7/28/2024, Normal            The patient’s home medications have been reviewed.    Allergies: Diphenhydramine    -------------------------------------------------- RESULTS -------------------------------------------------  All laboratory and radiology results have been personally reviewed by myself   LABS:  Labs Reviewed   URINALYSIS WITH REFLEX MICROSCOPIC - Abnormal       Result Value    Color, Urine Light-Orange (*)     Appearance, Urine Ex.Turbid (*)     Specific Gravity, Urine 1.019      pH, Urine 7.5      Protein, Urine 20 (TRACE)      Glucose, Urine Normal      Blood, Urine NEGATIVE      Ketones, Urine TRACE (*)     Bilirubin, Urine NEGATIVE      Urobilinogen, Urine OVER (4+) (*)     Nitrite, Urine NEGATIVE      Leukocyte Esterase, Urine 75 Germán/µL (*)    CBC WITH AUTO DIFFERENTIAL - Abnormal    WBC 8.6      nRBC 0.0      RBC 4.26      Hemoglobin 11.7 (*)     Hematocrit 37.3      MCV 88      MCH 27.5      MCHC 31.4 (*)     RDW 14.5      Platelets 300      Neutrophils % 72.8      Immature Granulocytes %, Automated 0.4      Lymphocytes % 17.7      Monocytes % 7.0      Eosinophils % 1.2      Basophils % 0.9      Neutrophils Absolute 6.23 (*)     Immature Granulocytes Absolute, Automated 0.03      Lymphocytes Absolute 1.51      Monocytes Absolute 0.60      Eosinophils Absolute 0.10      Basophils Absolute 0.08     BASIC METABOLIC PANEL - Abnormal    Glucose 122 (*)     Sodium 139      Potassium 4.0      Chloride 105      Bicarbonate 27      Anion Gap 11      Urea Nitrogen 24 (*)     Creatinine 0.89      eGFR 65      Calcium 8.8     MICROSCOPIC ONLY, URINE - Abnormal    WBC, Urine 21-50 (*)     RBC, Urine 3-5      Squamous Epithelial Cells, Urine  "1-9 (SPARSE)      Bacteria, Urine 4+ (*)     Amorphous Crystals, Urine 1+     TROPONIN I, HIGH SENSITIVITY - Normal    Troponin I, High Sensitivity 6      Narrative:     Less than 99th percentile of normal range cutoff-  Female and children under 18 years old <14 ng/L; Male <21 ng/L: Negative  Repeat testing should be performed if clinically indicated.     Female and children under 18 years old 14-50 ng/L; Male 21-50 ng/L:  Consistent with possible cardiac damage and possible increased clinical   risk. Serial measurements may help to assess extent of myocardial damage.     >50 ng/L: Consistent with cardiac damage, increased clinical risk and  myocardial infarction. Serial measurements may help assess extent of   myocardial damage.      NOTE: Children less than 1 year old may have higher baseline troponin   levels and results should be interpreted in conjunction with the overall   clinical context.     NOTE: Troponin I testing is performed using a different   testing methodology at [facility] than at other   [facility]. Direct result comparisons should only   be made within the same method.       EKG: Sinus rhythm 85 bpm, left anterior fascicular block, no acute ST elevations, no significant arrhythmia.  Interpreted by COLLETTE Mayorga MD    RADIOLOGY:  Interpreted by Radiologist.  XR ribs 2 views left w chest pa or ap   Final Result   No displaced rib fractures.  No acute cardiopulmonary disease.   Signed by Zaki Armenta DO          ------------------------- NURSING NOTES AND VITALS REVIEWED ---------------------------   The nursing notes within the ED encounter and vital signs as below have been reviewed.   /61   Pulse 86   Temp 36.3 °C (97.4 °F) (Temporal)   Resp 16   Ht 1.651 m (5' 5\")   Wt 49.4 kg (109 lb)   SpO2 96%   BMI 18.14 kg/m²   Oxygen Saturation Interpretation: Normal      ---------------------------------------------------PHYSICAL " EXAM--------------------------------------  Physical Exam   Constitutional/General: Alert and oriented x1, well appearing, non toxic in NAD  Head: Normocephalic and atraumatic  Eyes: PERRL, EOMI, conjunctiva normal, sclera non icteric  Mouth: Oropharynx clear, handling secretions, no trismus, no asymmetry of the posterior oropharynx or uvular edema  Neck: Supple, full ROM, non tender to palpation in the midline, no stridor, no crepitus, no meningeal signs  Respiratory: Lungs clear to auscultation bilaterally, no wheezes, rales, or rhonchi  Cardiovascular:  Regular rate. Regular rhythm. No murmurs, gallops, or rubs. 2+ distal pulses  Chest: Mild superficial abrasion and tenderness over the left lateral chest wall.  Subcutaneous air.  No tracheal shift no increased work of breathing no crepitance  GI:  Abdomen Soft, Non tender, Non distended.  +BS. No organomegaly, no palpable masses,  No rebound, guarding, or rigidity. No CVAT   Musculoskeletal: Moves all extremities x 4. Warm and well perfused, no clubbing, cyanosis, or edema. Capillary refill <3 seconds  Integument: skin warm and dry. No rashes.   Lymphatic: no lymphadenopathy noted  Neurologic:  No focal deficits, symmetric strength 5/5 in the upper and lower extremities bilaterally  Psychiatric: Normal Affect, mildly confused which is baseline per her attendant    Procedures    Diagnoses as of 07/26/24 0812   Abrasion of left chest wall, initial encounter   Contusion, chest wall, left, initial encounter   UTI (urinary tract infection), bacterial          Medical Decision Making:   Patient has a left chest wall contusion with a mild abrasion.  There is no evidence of rib fracture or pneumothorax.  There is no evidence of pulmonary contusion.  She has had a low-grade no tract infection will be started on Keflex.  She will be prescribed  Lidoderm patches for pain and is advised to follow-up with her primary care doctor in 1 week if not better.      Counseling:   The  emergency provider has spoken with the patient and discussed today’s results, in addition to providing specific details for the plan of care and counseling regarding the diagnosis and prognosis.  Questions are answered at this time and they are agreeable with the plan.      --------------------------------- IMPRESSION AND DISPOSITION ---------------------------------        IMPRESSION  1. Abrasion of left chest wall, initial encounter    2. Contusion, chest wall, left, initial encounter    3. UTI (urinary tract infection), bacterial        DISPOSITION  Disposition: Discharge to home  Patient condition is fair      Billing Provider Critical Care Time: 0 minutes     Bhavesh Mayorga MD  07/25/24 1512       Bhavesh Mayorga MD  07/26/24 0899

## 2024-07-25 NOTE — PROGRESS NOTES
Physical Therapy                 Therapy Communication Note    Patient Name: Kate Kaye  MRN: [id number]  Today's Date: 7/25/2024     Discipline: Physical Therapy    Missed Visit Reason:      Missed Time: Cancel    Comment:  Due to recent fall.

## 2024-07-25 NOTE — PROGRESS NOTES
Speech-Language Pathology                 Therapy Communication Note    Patient Name: Kate Kaye  MRN: [id number]  Today's Date: 7/25/2024     Discipline: Speech Language Pathology    Missed Visit Reason:  Pt cancelled scheduled ST session this date. Pt had fall at home and is awaiting x-ray results. Pt is currently scheduled for continued follow-up on 8/1/24 at 2:30pm    Missed Time: Cancel

## 2024-07-25 NOTE — DISCHARGE INSTRUCTIONS
Wash abrasion twice per day with antibacterial soap apply bacitracin and dry sterile dressing.    Lido-Derm as prescribed for pain relief.    Keflex as prescribed    Follow-up with your primary care doctor 1 week for recheck.    Return for worsening symptoms or concerns.

## 2024-07-26 LAB
ATRIAL RATE: 85 BPM
P AXIS: 38 DEGREES
P OFFSET: 182 MS
P ONSET: 141 MS
PR INTERVAL: 136 MS
Q ONSET: 209 MS
QRS COUNT: 14 BEATS
QRS DURATION: 102 MS
QT INTERVAL: 402 MS
QTC CALCULATION(BAZETT): 478 MS
QTC FREDERICIA: 451 MS
R AXIS: -46 DEGREES
T AXIS: 59 DEGREES
T OFFSET: 410 MS
VENTRICULAR RATE: 85 BPM

## 2024-08-01 ENCOUNTER — TREATMENT (OUTPATIENT)
Dept: SPEECH THERAPY | Facility: HOSPITAL | Age: 84
End: 2024-08-01
Payer: MEDICARE

## 2024-08-01 ENCOUNTER — TREATMENT (OUTPATIENT)
Dept: PHYSICAL THERAPY | Facility: HOSPITAL | Age: 84
End: 2024-08-01
Payer: MEDICARE

## 2024-08-01 DIAGNOSIS — R29.6 FREQUENT FALLS: Primary | ICD-10-CM

## 2024-08-01 DIAGNOSIS — I63.9 CEREBRAL INFARCTION, UNSPECIFIED (MULTI): Chronic | ICD-10-CM

## 2024-08-01 DIAGNOSIS — R41.841 COGNITIVE COMMUNICATION DEFICIT: Primary | Chronic | ICD-10-CM

## 2024-08-01 DIAGNOSIS — R13.10 DYSPHAGIA, UNSPECIFIED TYPE: Chronic | ICD-10-CM

## 2024-08-01 DIAGNOSIS — R26.81 GAIT INSTABILITY: ICD-10-CM

## 2024-08-01 PROCEDURE — 97110 THERAPEUTIC EXERCISES: CPT | Mod: GP | Performed by: PHYSICAL THERAPIST

## 2024-08-01 PROCEDURE — 97112 NEUROMUSCULAR REEDUCATION: CPT | Mod: GP | Performed by: PHYSICAL THERAPIST

## 2024-08-01 PROCEDURE — 92526 ORAL FUNCTION THERAPY: CPT | Mod: GN

## 2024-08-01 PROCEDURE — 92507 TX SP LANG VOICE COMM INDIV: CPT | Mod: GN

## 2024-08-01 ASSESSMENT — PAIN SCALES - WONG BAKER: WONGBAKER_NUMERICALRESPONSE: NO HURT

## 2024-08-01 ASSESSMENT — PAIN SCALES - GENERAL: PAINLEVEL_OUTOF10: 0 - NO PAIN

## 2024-08-01 ASSESSMENT — PAIN - FUNCTIONAL ASSESSMENT
PAIN_FUNCTIONAL_ASSESSMENT: 0-10
PAIN_FUNCTIONAL_ASSESSMENT: WONG-BAKER FACES

## 2024-08-01 NOTE — PROGRESS NOTES
Physical Therapy Treatment    Patient Name: Kate Kaye  MRN: [id number]  Today's Date: 8/1/2024  Time Calculation  Start Time: 1516  Stop Time: 1555  Time Calculation (min): 39 min        PT Therapeutic Procedures Time Entry  Neuromuscular Re-Education Time Entry: 10  Therapeutic Exercise Time Entry: 25  Gait Training Time Entry: 4                Current Problem  1. Frequent falls  Follow Up In Physical Therapy      2. Gait instability  Follow Up In Physical Therapy        Problem List Items Addressed This Visit             ICD-10-CM    Frequent falls - Primary R29.6    Gait instability R26.81       General  Reason for Referral: frequent falls  Referred By: Dr. Goldstein  Past Medical History Relevant to Rehab: Parkinson's Disease, Dementia    Subjective   Current Condition:   Same  Patient's caregiver reports that patient had a fall last week. The caregiver states that she was told that the patient got stuck between her mattress and the bed rail.    Performing HEP?: No    Precautions  Precautions  Medical Precautions: Fall precautions    Pain  Pain Assessment: 0-10  0-10 (Numeric) Pain Score: 0 - No pain    Objective     Treatments:  Therapeutic Exercise  Therapeutic Exercise Performed: Yes  Therapeutic Exercise Activity 1: H/L Hip Flexion x10  Therapeutic Exercise Activity 2: Iso Hip Add x10  Therapeutic Exercise Activity 3: H/L Hip ABD Red x15  Therapeutic Exercise Activity 4: Bridge x10  Therapeutic Exercise Activity 9: LAQ x10    Balance/Neuromuscular Re-Education  Balance/Neuromuscular Re-Education Activity Performed: Yes  Balance/Neuromuscular Re-Education Activity 1: seated anterior reaching cones with BUE  Balance/Neuromuscular Re-Education Activity 2: seated lateral trunk lean 2x5 R/L    Ambulation/Gait Training  Ambulation/Gait Training Performed: Yes  Ambulation/Gait Training 1  Surface 1: Level tile  Device 1: Parallel bars  Gait Support Devices: Wheelchair follow  Assistance 1: Maximum  assistance  Quality of Gait 1: Narrow base of support, Diminished heel strike, Inconsistent stride length, Decreased step length, Shuffling gait (posterior lean)  Comments/Distance (ft) 1: 6'x2 reps    EDUCATION:   Individual(s) Educated: Patient  Education Provided: patient/patient's caregiver  Handout(s) Provided: Scanned into chart  Home Program: reviewed home exercise program   Risk and Benefits Discussed with Patient/Caregiver/Other: Yes   Patient/Caregiver Demonstrated Understanding: Yes   Patient Response to Education: Patient/Caregiver verbalized understanding of information and Patient/Caregiver performed return demonstration of exercises/activities    Assessment: Patient required max A for all transfers and gait. Patient demonstrated a posterior lean when sitting unsupported and when performing sit<->stand. Patient required tactile cues/assistance to initiate hip flexion to perform stand->sit transfers. Patient demonstrated improved posture once gait was initiated, but she reverted to a posterior lean once gait was completed. Patient demonstrated an inability to carryover instructions, requiring frequent cueing to perform the same activity multiple times. Seated reaching activities improved her sitting posture. Prior to activity patient demonstrated a posterior lean and was unable to place her elbows on her knees. Post reaching activity patient demonstrated an upright posture without posterior lean.  PT Assessment  PT Assessment Results: Decreased strength, Decreased range of motion, Decreased endurance, Impaired balance, Decreased mobility, Decreased cognition  Rehab Prognosis: Fair  Evaluation/Treatment Tolerance: Patient tolerated treatment well    Plan: Continue with POC. Progress exercises as tolerated.  OP PT Plan  Treatment/Interventions: Education/ Instruction, Gait training, Neuromuscular re-education, Therapeutic activities, Therapeutic exercises  PT Plan: Skilled PT  PT Frequency: 1 time per  week  Duration: 5 weeks  Onset Date: 07/08/24  Certification Period Start Date: 07/10/24  Certification Period End Date: 08/14/24  Number of Treatments Authorized: 3 of 5  Rehab Potential: Fair  Plan of Care Agreement: Patient, Other (comment) (Caregiver)    Goals:  Active       PT Problem       Patient will demonstrate improved hamstring flexibility in bilateral lower extremities WFL.       Start:  07/10/24    Expected End:  08/14/24            Patient will maintain wide base of support stand for at least 30 sec with CONTACT GUARD ASSIST.       Start:  07/10/24    Expected End:  08/14/24            Patient will be able to maintain dynamic seated balance during reaching to floor.       Start:  07/10/24    Expected End:  08/14/24            Patient will perform chair to and from bed transfer with minimal assist with handhold assistance from caregiver.       Start:  07/10/24    Expected End:  08/14/24            Patient will perform supine<->sit on bed with stand by assist demonstrating control       Start:  07/10/24    Expected End:  08/14/24            Patient will achieve bilateral knee ROM of  0-120 degrees        Start:  07/10/24    Expected End:  08/14/24            Patient will achieve bilateral hip flexion ROM WFL.       Start:  07/10/24    Expected End:  08/14/24            Patient will show a significant change in AM-PAC (22 to 32) patient reported outcome tool to demonstrate subjective imporovement       Start:  07/10/24    Expected End:  08/14/24                 Daniel Lombardo, PT

## 2024-08-08 ENCOUNTER — APPOINTMENT (OUTPATIENT)
Dept: RADIOLOGY | Facility: HOSPITAL | Age: 84
End: 2024-08-08
Payer: MEDICARE

## 2024-08-08 ENCOUNTER — DOCUMENTATION (OUTPATIENT)
Dept: PHYSICAL THERAPY | Facility: HOSPITAL | Age: 84
End: 2024-08-08
Payer: MEDICARE

## 2024-08-08 ENCOUNTER — HOSPITAL ENCOUNTER (INPATIENT)
Facility: HOSPITAL | Age: 84
End: 2024-08-08
Attending: EMERGENCY MEDICINE | Admitting: INTERNAL MEDICINE
Payer: MEDICARE

## 2024-08-08 ENCOUNTER — APPOINTMENT (OUTPATIENT)
Dept: PHYSICAL THERAPY | Facility: HOSPITAL | Age: 84
End: 2024-08-08
Payer: MEDICARE

## 2024-08-08 DIAGNOSIS — R62.7 FAILURE TO THRIVE IN ADULT: ICD-10-CM

## 2024-08-08 DIAGNOSIS — J18.9 PNEUMONIA DUE TO INFECTIOUS ORGANISM, UNSPECIFIED LATERALITY, UNSPECIFIED PART OF LUNG: Primary | ICD-10-CM

## 2024-08-08 DIAGNOSIS — G30.9 ALZHEIMER'S DEMENTIA WITHOUT BEHAVIORAL DISTURBANCE, PSYCHOTIC DISTURBANCE, MOOD DISTURBANCE, OR ANXIETY, UNSPECIFIED DEMENTIA SEVERITY, UNSPECIFIED TIMING OF DEMENTIA ONSET (MULTI): ICD-10-CM

## 2024-08-08 DIAGNOSIS — F02.80 ALZHEIMER'S DEMENTIA WITHOUT BEHAVIORAL DISTURBANCE, PSYCHOTIC DISTURBANCE, MOOD DISTURBANCE, OR ANXIETY, UNSPECIFIED DEMENTIA SEVERITY, UNSPECIFIED TIMING OF DEMENTIA ONSET (MULTI): ICD-10-CM

## 2024-08-08 DIAGNOSIS — R41.82 ALTERED MENTAL STATUS, UNSPECIFIED ALTERED MENTAL STATUS TYPE: ICD-10-CM

## 2024-08-08 DIAGNOSIS — N30.00 ACUTE CYSTITIS WITHOUT HEMATURIA: ICD-10-CM

## 2024-08-08 LAB
ALBUMIN SERPL BCP-MCNC: 3.6 G/DL (ref 3.4–5)
ALP SERPL-CCNC: 116 U/L (ref 33–136)
ALT SERPL W P-5'-P-CCNC: 27 U/L (ref 7–45)
ANION GAP SERPL CALC-SCNC: 12 MMOL/L (ref 10–20)
AST SERPL W P-5'-P-CCNC: 179 U/L (ref 9–39)
BASOPHILS # BLD AUTO: 0.06 X10*3/UL (ref 0–0.1)
BASOPHILS NFR BLD AUTO: 0.6 %
BILIRUB SERPL-MCNC: 0.4 MG/DL (ref 0–1.2)
BUN SERPL-MCNC: 17 MG/DL (ref 6–23)
CALCIUM SERPL-MCNC: 8.8 MG/DL (ref 8.6–10.3)
CARDIAC TROPONIN I PNL SERPL HS: 4 NG/L (ref 0–13)
CHLORIDE SERPL-SCNC: 102 MMOL/L (ref 98–107)
CO2 SERPL-SCNC: 23 MMOL/L (ref 21–32)
CREAT SERPL-MCNC: 0.7 MG/DL (ref 0.5–1.05)
EGFRCR SERPLBLD CKD-EPI 2021: 87 ML/MIN/1.73M*2
EOSINOPHIL # BLD AUTO: 0.04 X10*3/UL (ref 0–0.4)
EOSINOPHIL NFR BLD AUTO: 0.4 %
ERYTHROCYTE [DISTWIDTH] IN BLOOD BY AUTOMATED COUNT: 14.3 % (ref 11.5–14.5)
GLUCOSE SERPL-MCNC: 112 MG/DL (ref 74–99)
HCT VFR BLD AUTO: 35.3 % (ref 36–46)
HGB BLD-MCNC: 11.6 G/DL (ref 12–16)
IMM GRANULOCYTES # BLD AUTO: 0.05 X10*3/UL (ref 0–0.5)
IMM GRANULOCYTES NFR BLD AUTO: 0.5 % (ref 0–0.9)
LACTATE SERPL-SCNC: 1.1 MMOL/L (ref 0.4–2)
LIPASE SERPL-CCNC: 4 U/L (ref 9–82)
LYMPHOCYTES # BLD AUTO: 1.21 X10*3/UL (ref 0.8–3)
LYMPHOCYTES NFR BLD AUTO: 11.4 %
MAGNESIUM SERPL-MCNC: 2.12 MG/DL (ref 1.6–2.4)
MCH RBC QN AUTO: 28.3 PG (ref 26–34)
MCHC RBC AUTO-ENTMCNC: 32.9 G/DL (ref 32–36)
MCV RBC AUTO: 86 FL (ref 80–100)
MONOCYTES # BLD AUTO: 0.68 X10*3/UL (ref 0.05–0.8)
MONOCYTES NFR BLD AUTO: 6.4 %
NEUTROPHILS # BLD AUTO: 8.56 X10*3/UL (ref 1.6–5.5)
NEUTROPHILS NFR BLD AUTO: 80.7 %
NRBC BLD-RTO: 0 /100 WBCS (ref 0–0)
PLATELET # BLD AUTO: 293 X10*3/UL (ref 150–450)
POTASSIUM SERPL-SCNC: 3.7 MMOL/L (ref 3.5–5.3)
PROT SERPL-MCNC: 6.1 G/DL (ref 6.4–8.2)
RBC # BLD AUTO: 4.1 X10*6/UL (ref 4–5.2)
SODIUM SERPL-SCNC: 133 MMOL/L (ref 136–145)
WBC # BLD AUTO: 10.6 X10*3/UL (ref 4.4–11.3)

## 2024-08-08 PROCEDURE — 71045 X-RAY EXAM CHEST 1 VIEW: CPT

## 2024-08-08 PROCEDURE — 93010 ELECTROCARDIOGRAM REPORT: CPT | Performed by: INTERNAL MEDICINE

## 2024-08-08 PROCEDURE — 71045 X-RAY EXAM CHEST 1 VIEW: CPT | Performed by: RADIOLOGY

## 2024-08-08 PROCEDURE — 99285 EMERGENCY DEPT VISIT HI MDM: CPT

## 2024-08-08 PROCEDURE — 80053 COMPREHEN METABOLIC PANEL: CPT | Performed by: EMERGENCY MEDICINE

## 2024-08-08 PROCEDURE — 85025 COMPLETE CBC W/AUTO DIFF WBC: CPT | Performed by: EMERGENCY MEDICINE

## 2024-08-08 PROCEDURE — 83690 ASSAY OF LIPASE: CPT | Performed by: EMERGENCY MEDICINE

## 2024-08-08 PROCEDURE — 83605 ASSAY OF LACTIC ACID: CPT | Performed by: EMERGENCY MEDICINE

## 2024-08-08 PROCEDURE — 83735 ASSAY OF MAGNESIUM: CPT | Performed by: EMERGENCY MEDICINE

## 2024-08-08 PROCEDURE — 84484 ASSAY OF TROPONIN QUANT: CPT | Performed by: EMERGENCY MEDICINE

## 2024-08-08 PROCEDURE — 36415 COLL VENOUS BLD VENIPUNCTURE: CPT | Performed by: EMERGENCY MEDICINE

## 2024-08-08 RX ORDER — SODIUM CHLORIDE 9 MG/ML
125 INJECTION, SOLUTION INTRAVENOUS CONTINUOUS
Status: DISCONTINUED | OUTPATIENT
Start: 2024-08-08 | End: 2024-08-09

## 2024-08-08 ASSESSMENT — COLUMBIA-SUICIDE SEVERITY RATING SCALE - C-SSRS
1. IN THE PAST MONTH, HAVE YOU WISHED YOU WERE DEAD OR WISHED YOU COULD GO TO SLEEP AND NOT WAKE UP?: NO
6. HAVE YOU EVER DONE ANYTHING, STARTED TO DO ANYTHING, OR PREPARED TO DO ANYTHING TO END YOUR LIFE?: NO
2. HAVE YOU ACTUALLY HAD ANY THOUGHTS OF KILLING YOURSELF?: NO

## 2024-08-08 ASSESSMENT — PAIN - FUNCTIONAL ASSESSMENT: PAIN_FUNCTIONAL_ASSESSMENT: 0-10

## 2024-08-08 ASSESSMENT — PAIN SCALES - GENERAL
PAINLEVEL_OUTOF10: 0 - NO PAIN
PAINLEVEL_OUTOF10: 0 - NO PAIN

## 2024-08-08 NOTE — PROGRESS NOTES
Physical Therapy                 Therapy Communication Note    Patient Name: Kate Kaye  MRN: [id number]  Today's Date: 8/8/2024     Discipline: Physical Therapy    Missed Visit Reason:  d/t illness    Missed Time: Cancel 3:15

## 2024-08-09 ENCOUNTER — APPOINTMENT (OUTPATIENT)
Dept: CARDIOLOGY | Facility: HOSPITAL | Age: 84
End: 2024-08-09
Payer: MEDICARE

## 2024-08-09 PROBLEM — F34.1 PERSISTENT DEPRESSIVE DISORDER: Status: ACTIVE | Noted: 2024-08-09

## 2024-08-09 PROBLEM — F02.80 LEWY BODY DEMENTIA (MULTI): Status: ACTIVE | Noted: 2024-08-09

## 2024-08-09 PROBLEM — F41.9 ANXIETY: Status: ACTIVE | Noted: 2024-08-09

## 2024-08-09 PROBLEM — G31.83 LEWY BODY DEMENTIA (MULTI): Status: ACTIVE | Noted: 2024-08-09

## 2024-08-09 PROBLEM — G20.A1: Status: ACTIVE | Noted: 2024-08-09

## 2024-08-09 PROBLEM — J18.9 PNEUMONIA DUE TO INFECTIOUS ORGANISM, UNSPECIFIED LATERALITY, UNSPECIFIED PART OF LUNG: Status: ACTIVE | Noted: 2024-08-09

## 2024-08-09 LAB
ANION GAP SERPL CALC-SCNC: 12 MMOL/L (ref 10–20)
APPEARANCE UR: ABNORMAL
ATRIAL RATE: 75 BPM
BACTERIA #/AREA URNS AUTO: ABNORMAL /HPF
BASOPHILS # BLD AUTO: 0.08 X10*3/UL (ref 0–0.1)
BASOPHILS NFR BLD AUTO: 1 %
BILIRUB UR STRIP.AUTO-MCNC: NEGATIVE MG/DL
BUN SERPL-MCNC: 14 MG/DL (ref 6–23)
CALCIUM SERPL-MCNC: 8.6 MG/DL (ref 8.6–10.3)
CARDIAC TROPONIN I PNL SERPL HS: 4 NG/L (ref 0–13)
CHLORIDE SERPL-SCNC: 104 MMOL/L (ref 98–107)
CO2 SERPL-SCNC: 24 MMOL/L (ref 21–32)
COLOR UR: ABNORMAL
CREAT SERPL-MCNC: 0.71 MG/DL (ref 0.5–1.05)
EGFRCR SERPLBLD CKD-EPI 2021: 86 ML/MIN/1.73M*2
EOSINOPHIL # BLD AUTO: 0.24 X10*3/UL (ref 0–0.4)
EOSINOPHIL NFR BLD AUTO: 2.9 %
ERYTHROCYTE [DISTWIDTH] IN BLOOD BY AUTOMATED COUNT: 14.6 % (ref 11.5–14.5)
GLUCOSE SERPL-MCNC: 71 MG/DL (ref 74–99)
GLUCOSE UR STRIP.AUTO-MCNC: NORMAL MG/DL
HCT VFR BLD AUTO: 35.4 % (ref 36–46)
HGB BLD-MCNC: 11.3 G/DL (ref 12–16)
HOLD SPECIMEN: NORMAL
IMM GRANULOCYTES # BLD AUTO: 0.02 X10*3/UL (ref 0–0.5)
IMM GRANULOCYTES NFR BLD AUTO: 0.2 % (ref 0–0.9)
KETONES UR STRIP.AUTO-MCNC: NEGATIVE MG/DL
LEUKOCYTE ESTERASE UR QL STRIP.AUTO: ABNORMAL
LYMPHOCYTES # BLD AUTO: 1.68 X10*3/UL (ref 0.8–3)
LYMPHOCYTES NFR BLD AUTO: 20.3 %
MCH RBC QN AUTO: 28.6 PG (ref 26–34)
MCHC RBC AUTO-ENTMCNC: 31.9 G/DL (ref 32–36)
MCV RBC AUTO: 90 FL (ref 80–100)
MONOCYTES # BLD AUTO: 0.86 X10*3/UL (ref 0.05–0.8)
MONOCYTES NFR BLD AUTO: 10.4 %
MUCOUS THREADS #/AREA URNS AUTO: ABNORMAL /LPF
NEUTROPHILS # BLD AUTO: 5.41 X10*3/UL (ref 1.6–5.5)
NEUTROPHILS NFR BLD AUTO: 65.2 %
NITRITE UR QL STRIP.AUTO: NEGATIVE
NRBC BLD-RTO: 0 /100 WBCS (ref 0–0)
P AXIS: 54 DEGREES
P OFFSET: 187 MS
P ONSET: 129 MS
PH UR STRIP.AUTO: 6.5 [PH]
PLATELET # BLD AUTO: 316 X10*3/UL (ref 150–450)
POTASSIUM SERPL-SCNC: 3.4 MMOL/L (ref 3.5–5.3)
PR INTERVAL: 164 MS
PROCALCITONIN SERPL-MCNC: 0.14 NG/ML
PROT UR STRIP.AUTO-MCNC: ABNORMAL MG/DL
Q ONSET: 211 MS
QRS COUNT: 12 BEATS
QRS DURATION: 96 MS
QT INTERVAL: 426 MS
QTC CALCULATION(BAZETT): 475 MS
QTC FREDERICIA: 458 MS
R AXIS: -38 DEGREES
RBC # BLD AUTO: 3.95 X10*6/UL (ref 4–5.2)
RBC # UR STRIP.AUTO: ABNORMAL /UL
RBC #/AREA URNS AUTO: ABNORMAL /HPF
SARS-COV-2 RNA RESP QL NAA+PROBE: NOT DETECTED
SODIUM SERPL-SCNC: 137 MMOL/L (ref 136–145)
SP GR UR STRIP.AUTO: 1.01
T AXIS: 48 DEGREES
T OFFSET: 424 MS
UROBILINOGEN UR STRIP.AUTO-MCNC: NORMAL MG/DL
VENTRICULAR RATE: 75 BPM
WBC # BLD AUTO: 8.3 X10*3/UL (ref 4.4–11.3)
WBC #/AREA URNS AUTO: ABNORMAL /HPF

## 2024-08-09 PROCEDURE — 84484 ASSAY OF TROPONIN QUANT: CPT | Performed by: EMERGENCY MEDICINE

## 2024-08-09 PROCEDURE — 9420000001 HC RT PATIENT EDUCATION 5 MIN

## 2024-08-09 PROCEDURE — 81001 URINALYSIS AUTO W/SCOPE: CPT | Performed by: EMERGENCY MEDICINE

## 2024-08-09 PROCEDURE — 87635 SARS-COV-2 COVID-19 AMP PRB: CPT | Performed by: EMERGENCY MEDICINE

## 2024-08-09 PROCEDURE — 2500000002 HC RX 250 W HCPCS SELF ADMINISTERED DRUGS (ALT 637 FOR MEDICARE OP, ALT 636 FOR OP/ED): Mod: IPSPLIT | Performed by: INTERNAL MEDICINE

## 2024-08-09 PROCEDURE — 2500000004 HC RX 250 GENERAL PHARMACY W/ HCPCS (ALT 636 FOR OP/ED): Mod: IPSPLIT | Performed by: NURSE PRACTITIONER

## 2024-08-09 PROCEDURE — 2500000004 HC RX 250 GENERAL PHARMACY W/ HCPCS (ALT 636 FOR OP/ED): Mod: IPSPLIT

## 2024-08-09 PROCEDURE — 36415 COLL VENOUS BLD VENIPUNCTURE: CPT | Mod: IPSPLIT | Performed by: NURSE PRACTITIONER

## 2024-08-09 PROCEDURE — 92610 EVALUATE SWALLOWING FUNCTION: CPT | Mod: GN,IPSPLIT | Performed by: SPEECH-LANGUAGE PATHOLOGIST

## 2024-08-09 PROCEDURE — 94640 AIRWAY INHALATION TREATMENT: CPT | Mod: IPSPLIT

## 2024-08-09 PROCEDURE — 82374 ASSAY BLOOD CARBON DIOXIDE: CPT | Mod: IPSPLIT | Performed by: NURSE PRACTITIONER

## 2024-08-09 PROCEDURE — 2500000002 HC RX 250 W HCPCS SELF ADMINISTERED DRUGS (ALT 637 FOR MEDICARE OP, ALT 636 FOR OP/ED): Mod: IPSPLIT | Performed by: NURSE PRACTITIONER

## 2024-08-09 PROCEDURE — 2500000004 HC RX 250 GENERAL PHARMACY W/ HCPCS (ALT 636 FOR OP/ED): Performed by: EMERGENCY MEDICINE

## 2024-08-09 PROCEDURE — 93005 ELECTROCARDIOGRAM TRACING: CPT

## 2024-08-09 PROCEDURE — 97162 PT EVAL MOD COMPLEX 30 MIN: CPT | Mod: GP,IPSPLIT | Performed by: PHYSICAL THERAPIST

## 2024-08-09 PROCEDURE — 87086 URINE CULTURE/COLONY COUNT: CPT | Mod: GENLAB | Performed by: EMERGENCY MEDICINE

## 2024-08-09 PROCEDURE — 96365 THER/PROPH/DIAG IV INF INIT: CPT | Mod: IPSPLIT

## 2024-08-09 PROCEDURE — 94760 N-INVAS EAR/PLS OXIMETRY 1: CPT

## 2024-08-09 PROCEDURE — 2500000001 HC RX 250 WO HCPCS SELF ADMINISTERED DRUGS (ALT 637 FOR MEDICARE OP): Mod: IPSPLIT | Performed by: NURSE PRACTITIONER

## 2024-08-09 PROCEDURE — 96361 HYDRATE IV INFUSION ADD-ON: CPT | Mod: IPSPLIT

## 2024-08-09 PROCEDURE — 84145 PROCALCITONIN (PCT): CPT | Mod: GENLAB | Performed by: NURSE PRACTITIONER

## 2024-08-09 PROCEDURE — 94664 DEMO&/EVAL PT USE INHALER: CPT

## 2024-08-09 PROCEDURE — 1100000001 HC PRIVATE ROOM DAILY: Mod: IPSPLIT

## 2024-08-09 PROCEDURE — 2500000004 HC RX 250 GENERAL PHARMACY W/ HCPCS (ALT 636 FOR OP/ED)

## 2024-08-09 PROCEDURE — 2500000005 HC RX 250 GENERAL PHARMACY W/O HCPCS: Mod: IPSPLIT | Performed by: NURSE PRACTITIONER

## 2024-08-09 PROCEDURE — P9612 CATHETERIZE FOR URINE SPEC: HCPCS | Mod: IPSPLIT

## 2024-08-09 PROCEDURE — 92526 ORAL FUNCTION THERAPY: CPT | Mod: GN,IPSPLIT | Performed by: SPEECH-LANGUAGE PATHOLOGIST

## 2024-08-09 PROCEDURE — 85025 COMPLETE CBC W/AUTO DIFF WBC: CPT | Mod: IPSPLIT | Performed by: NURSE PRACTITIONER

## 2024-08-09 PROCEDURE — 99222 1ST HOSP IP/OBS MODERATE 55: CPT | Performed by: NURSE PRACTITIONER

## 2024-08-09 PROCEDURE — 36415 COLL VENOUS BLD VENIPUNCTURE: CPT | Performed by: EMERGENCY MEDICINE

## 2024-08-09 RX ORDER — FLUOXETINE HYDROCHLORIDE 40 MG/1
1 CAPSULE ORAL 2 TIMES DAILY
Status: ON HOLD | COMMUNITY

## 2024-08-09 RX ORDER — IPRATROPIUM BROMIDE AND ALBUTEROL SULFATE 2.5; .5 MG/3ML; MG/3ML
3 SOLUTION RESPIRATORY (INHALATION)
Status: DISCONTINUED | OUTPATIENT
Start: 2024-08-09 | End: 2024-08-09

## 2024-08-09 RX ORDER — BUSPIRONE HYDROCHLORIDE 5 MG/1
5 TABLET ORAL NIGHTLY
Status: DISPENSED | OUTPATIENT
Start: 2024-08-09

## 2024-08-09 RX ORDER — ENOXAPARIN SODIUM 100 MG/ML
40 INJECTION SUBCUTANEOUS DAILY
Status: DISPENSED | OUTPATIENT
Start: 2024-08-09

## 2024-08-09 RX ORDER — LORAZEPAM 0.5 MG/1
1 TABLET ORAL 2 TIMES DAILY PRN
Status: ACTIVE | OUTPATIENT
Start: 2024-08-09

## 2024-08-09 RX ORDER — AMITRIPTYLINE HYDROCHLORIDE 25 MG/1
50 TABLET, FILM COATED ORAL NIGHTLY
Status: ON HOLD | COMMUNITY

## 2024-08-09 RX ORDER — LAMOTRIGINE 100 MG/1
200 TABLET ORAL NIGHTLY
Status: DISPENSED | OUTPATIENT
Start: 2024-08-09

## 2024-08-09 RX ORDER — ACETAMINOPHEN 650 MG/1
650 SUPPOSITORY RECTAL EVERY 4 HOURS PRN
Status: DISCONTINUED | OUTPATIENT
Start: 2024-08-09 | End: 2024-08-11

## 2024-08-09 RX ORDER — CEFTRIAXONE 2 G/50ML
2 INJECTION, SOLUTION INTRAVENOUS ONCE
Status: COMPLETED | OUTPATIENT
Start: 2024-08-09 | End: 2024-08-09

## 2024-08-09 RX ORDER — TALC
POWDER (GRAM) TOPICAL
Status: ON HOLD | COMMUNITY

## 2024-08-09 RX ORDER — LORAZEPAM 1 MG/1
1 TABLET ORAL 2 TIMES DAILY PRN
Status: ON HOLD | COMMUNITY

## 2024-08-09 RX ORDER — QUETIAPINE FUMARATE 25 MG/1
37.5 TABLET, FILM COATED ORAL
Status: ON HOLD | COMMUNITY
Start: 2024-08-06 | End: 2025-08-06

## 2024-08-09 RX ORDER — CARBIDOPA AND LEVODOPA 25; 100 MG/1; MG/1
1.5 TABLET ORAL 3 TIMES DAILY
Status: ON HOLD | COMMUNITY
Start: 2024-06-26 | End: 2025-06-26

## 2024-08-09 RX ORDER — IPRATROPIUM BROMIDE AND ALBUTEROL SULFATE 2.5; .5 MG/3ML; MG/3ML
3 SOLUTION RESPIRATORY (INHALATION) EVERY 2 HOUR PRN
Status: DISPENSED | OUTPATIENT
Start: 2024-08-09

## 2024-08-09 RX ORDER — ACETAMINOPHEN 325 MG/1
650 TABLET ORAL EVERY 4 HOURS PRN
Status: ACTIVE | OUTPATIENT
Start: 2024-08-09

## 2024-08-09 RX ORDER — ACETAMINOPHEN 160 MG/5ML
650 SOLUTION ORAL EVERY 4 HOURS PRN
Status: DISCONTINUED | OUTPATIENT
Start: 2024-08-09 | End: 2024-08-11

## 2024-08-09 RX ORDER — QUETIAPINE FUMARATE 25 MG/1
37.5 TABLET, FILM COATED ORAL NIGHTLY
Status: DISPENSED | OUTPATIENT
Start: 2024-08-09

## 2024-08-09 RX ORDER — FLUOXETINE HYDROCHLORIDE 20 MG/1
40 CAPSULE ORAL 2 TIMES DAILY
Status: DISPENSED | OUTPATIENT
Start: 2024-08-09

## 2024-08-09 RX ORDER — SODIUM CHLORIDE 9 MG/ML
75 INJECTION, SOLUTION INTRAVENOUS CONTINUOUS
Status: ACTIVE | OUTPATIENT
Start: 2024-08-09

## 2024-08-09 RX ORDER — LAMOTRIGINE 200 MG/1
200 TABLET ORAL NIGHTLY
Status: ON HOLD | COMMUNITY
Start: 2024-08-06

## 2024-08-09 RX ORDER — SODIUM CHLORIDE 9 MG/ML
10 INJECTION, SOLUTION INTRAVENOUS CONTINUOUS PRN
Status: ACTIVE | OUTPATIENT
Start: 2024-08-09

## 2024-08-09 RX ORDER — POLYETHYLENE GLYCOL 3350 17 G/17G
17 POWDER, FOR SOLUTION ORAL DAILY
Status: DISPENSED | OUTPATIENT
Start: 2024-08-09

## 2024-08-09 RX ORDER — POLYETHYLENE GLYCOL 3350 17 G/17G
17 POWDER, FOR SOLUTION ORAL DAILY
Status: ON HOLD | COMMUNITY

## 2024-08-09 RX ORDER — MEMANTINE HYDROCHLORIDE 5 MG/1
20 TABLET ORAL DAILY
Status: DISPENSED | OUTPATIENT
Start: 2024-08-09

## 2024-08-09 RX ORDER — AMITRIPTYLINE HYDROCHLORIDE 25 MG/1
50 TABLET, FILM COATED ORAL NIGHTLY
Status: DISPENSED | OUTPATIENT
Start: 2024-08-09

## 2024-08-09 RX ORDER — BUPROPION HYDROCHLORIDE 300 MG/1
300 TABLET ORAL
Status: ON HOLD | COMMUNITY
Start: 2024-06-25 | End: 2025-06-25

## 2024-08-09 RX ORDER — GUAIFENESIN 100 MG/5ML
200 SOLUTION ORAL EVERY 4 HOURS PRN
Status: ACTIVE | OUTPATIENT
Start: 2024-08-09

## 2024-08-09 RX ORDER — SODIUM CHLORIDE 9 MG/ML
INJECTION, SOLUTION INTRAVENOUS
Status: COMPLETED
Start: 2024-08-09 | End: 2024-08-09

## 2024-08-09 RX ORDER — TALC
3 POWDER (GRAM) TOPICAL NIGHTLY PRN
Status: DISPENSED | OUTPATIENT
Start: 2024-08-09

## 2024-08-09 RX ORDER — CEFTRIAXONE 1 G/50ML
1 INJECTION, SOLUTION INTRAVENOUS EVERY 24 HOURS
Status: DISPENSED | OUTPATIENT
Start: 2024-08-10 | End: 2024-08-15

## 2024-08-09 RX ORDER — ROSUVASTATIN CALCIUM 10 MG/1
5 TABLET, COATED ORAL NIGHTLY
Status: DISPENSED | OUTPATIENT
Start: 2024-08-09

## 2024-08-09 RX ORDER — MEMANTINE HYDROCHLORIDE 5 MG/1
5 TABLET ORAL 2 TIMES DAILY
Status: ON HOLD | COMMUNITY
Start: 2024-08-06 | End: 2024-09-05

## 2024-08-09 RX ORDER — DONEPEZIL HYDROCHLORIDE 5 MG/1
5 TABLET, FILM COATED ORAL NIGHTLY
Status: ON HOLD | COMMUNITY

## 2024-08-09 RX ORDER — DONEPEZIL HYDROCHLORIDE 5 MG/1
5 TABLET, FILM COATED ORAL NIGHTLY
Status: DISPENSED | OUTPATIENT
Start: 2024-08-09

## 2024-08-09 RX ORDER — BUSPIRONE HYDROCHLORIDE 5 MG/1
5 TABLET ORAL NIGHTLY
Status: ON HOLD | COMMUNITY
Start: 2024-07-08

## 2024-08-09 RX ORDER — CARBIDOPA AND LEVODOPA 25; 100 MG/1; MG/1
1.5 TABLET ORAL 3 TIMES DAILY
Status: DISPENSED | OUTPATIENT
Start: 2024-08-09

## 2024-08-09 RX ORDER — ROSUVASTATIN CALCIUM 5 MG/1
5 TABLET, COATED ORAL DAILY
Status: ON HOLD | COMMUNITY

## 2024-08-09 RX ORDER — BUPROPION HYDROCHLORIDE 150 MG/1
300 TABLET ORAL
Status: DISPENSED | OUTPATIENT
Start: 2024-08-09

## 2024-08-09 RX ORDER — PANTOPRAZOLE SODIUM 40 MG/1
40 TABLET, DELAYED RELEASE ORAL DAILY
Status: DISPENSED | OUTPATIENT
Start: 2024-08-09

## 2024-08-09 SDOH — SOCIAL STABILITY: SOCIAL INSECURITY: HAVE YOU HAD ANY THOUGHTS OF HARMING ANYONE ELSE?: UNABLE TO ASSESS

## 2024-08-09 SDOH — SOCIAL STABILITY: SOCIAL INSECURITY: DOES ANYONE TRY TO KEEP YOU FROM HAVING/CONTACTING OTHER FRIENDS OR DOING THINGS OUTSIDE YOUR HOME?: UNABLE TO ASSESS

## 2024-08-09 SDOH — SOCIAL STABILITY: SOCIAL INSECURITY: WERE YOU ABLE TO COMPLETE ALL THE BEHAVIORAL HEALTH SCREENINGS?: YES

## 2024-08-09 SDOH — SOCIAL STABILITY: SOCIAL INSECURITY: DO YOU FEEL ANYONE HAS EXPLOITED OR TAKEN ADVANTAGE OF YOU FINANCIALLY OR OF YOUR PERSONAL PROPERTY?: UNABLE TO ASSESS

## 2024-08-09 SDOH — SOCIAL STABILITY: SOCIAL INSECURITY: ARE YOU OR HAVE YOU BEEN THREATENED OR ABUSED PHYSICALLY, EMOTIONALLY, OR SEXUALLY BY ANYONE?: UNABLE TO ASSESS

## 2024-08-09 SDOH — SOCIAL STABILITY: SOCIAL INSECURITY: ARE THERE ANY APPARENT SIGNS OF INJURIES/BEHAVIORS THAT COULD BE RELATED TO ABUSE/NEGLECT?: UNABLE TO ASSESS

## 2024-08-09 SDOH — SOCIAL STABILITY: SOCIAL INSECURITY: ABUSE: ADULT

## 2024-08-09 SDOH — SOCIAL STABILITY: SOCIAL INSECURITY: HAS ANYONE EVER THREATENED TO HURT YOUR FAMILY OR YOUR PETS?: UNABLE TO ASSESS

## 2024-08-09 SDOH — SOCIAL STABILITY: SOCIAL INSECURITY: DO YOU FEEL UNSAFE GOING BACK TO THE PLACE WHERE YOU ARE LIVING?: UNABLE TO ASSESS

## 2024-08-09 SDOH — ECONOMIC STABILITY: INCOME INSECURITY: IN THE LAST 12 MONTHS, WAS THERE A TIME WHEN YOU WERE NOT ABLE TO PAY THE MORTGAGE OR RENT ON TIME?: PATIENT DECLINED

## 2024-08-09 SDOH — ECONOMIC STABILITY: HOUSING INSECURITY: IN THE PAST 12 MONTHS, HOW MANY TIMES HAVE YOU MOVED WHERE YOU WERE LIVING?: 1

## 2024-08-09 SDOH — SOCIAL STABILITY: SOCIAL INSECURITY: WERE YOU ABLE TO COMPLETE ALL THE BEHAVIORAL HEALTH SCREENINGS?: NO

## 2024-08-09 SDOH — ECONOMIC STABILITY: INCOME INSECURITY
HOW HARD IS IT FOR YOU TO PAY FOR THE VERY BASICS LIKE FOOD, HOUSING, MEDICAL CARE, AND HEATING?: PATIENT UNABLE TO ANSWER

## 2024-08-09 ASSESSMENT — PATIENT HEALTH QUESTIONNAIRE - PHQ9
SUM OF ALL RESPONSES TO PHQ9 QUESTIONS 1 & 2: 0
2. FEELING DOWN, DEPRESSED OR HOPELESS: NOT AT ALL
1. LITTLE INTEREST OR PLEASURE IN DOING THINGS: NOT AT ALL

## 2024-08-09 ASSESSMENT — PAIN SCALES - GENERAL
PAINLEVEL_OUTOF10: 0 - NO PAIN

## 2024-08-09 ASSESSMENT — COGNITIVE AND FUNCTIONAL STATUS - GENERAL
STANDING UP FROM CHAIR USING ARMS: A LOT
TOILETING: A LITTLE
DAILY ACTIVITIY SCORE: 18
CLIMB 3 TO 5 STEPS WITH RAILING: TOTAL
PATIENT BASELINE BEDBOUND: NO
STANDING UP FROM CHAIR USING ARMS: A LOT
TURNING FROM BACK TO SIDE WHILE IN FLAT BAD: A LOT
PERSONAL GROOMING: A LITTLE
MOVING TO AND FROM BED TO CHAIR: A LOT
MOBILITY SCORE: 10
WALKING IN HOSPITAL ROOM: A LOT
WALKING IN HOSPITAL ROOM: TOTAL
MOVING FROM LYING ON BACK TO SITTING ON SIDE OF FLAT BED WITH BEDRAILS: A LOT
DRESSING REGULAR LOWER BODY CLOTHING: A LITTLE
MOVING TO AND FROM BED TO CHAIR: A LITTLE
DRESSING REGULAR UPPER BODY CLOTHING: A LITTLE
EATING MEALS: A LITTLE
TURNING FROM BACK TO SIDE WHILE IN FLAT BAD: A LOT
CLIMB 3 TO 5 STEPS WITH RAILING: A LOT
HELP NEEDED FOR BATHING: A LITTLE

## 2024-08-09 ASSESSMENT — PAIN INTENSITY VAS: VAS_PAIN_BASICVITALS_IP: 0

## 2024-08-09 ASSESSMENT — ACTIVITIES OF DAILY LIVING (ADL)
HEARING - RIGHT EAR: FUNCTIONAL
DRESSING YOURSELF: DEPENDENT
ASSISTIVE_DEVICE: WALKER
GROOMING: NEEDS ASSISTANCE
HEARING - LEFT EAR: FUNCTIONAL
ADEQUATE_TO_COMPLETE_ADL: YES
TOILETING: NEEDS ASSISTANCE
WALKS IN HOME: NEEDS ASSISTANCE
JUDGMENT_ADEQUATE_SAFELY_COMPLETE_DAILY_ACTIVITIES: NO
FEEDING YOURSELF: INDEPENDENT
HEARING - RIGHT EAR: FUNCTIONAL
GROOMING: NEEDS ASSISTANCE
ADEQUATE_TO_COMPLETE_ADL: YES
PATIENT'S MEMORY ADEQUATE TO SAFELY COMPLETE DAILY ACTIVITIES?: NO
ADL_ASSISTANCE: NEEDS ASSISTANCE
FEEDING YOURSELF: NEEDS ASSISTANCE
DRESSING YOURSELF: NEEDS ASSISTANCE
PATIENT'S MEMORY ADEQUATE TO SAFELY COMPLETE DAILY ACTIVITIES?: NO
HEARING - LEFT EAR: FUNCTIONAL
JUDGMENT_ADEQUATE_SAFELY_COMPLETE_DAILY_ACTIVITIES: NO
TOILETING: NEEDS ASSISTANCE
BATHING: NEEDS ASSISTANCE
BATHING: NEEDS ASSISTANCE
WALKS IN HOME: NEEDS ASSISTANCE

## 2024-08-09 ASSESSMENT — LIFESTYLE VARIABLES: HOW OFTEN DO YOU HAVE 6 OR MORE DRINKS ON ONE OCCASION: PATIENT DECLINED

## 2024-08-09 ASSESSMENT — PAIN - FUNCTIONAL ASSESSMENT: PAIN_FUNCTIONAL_ASSESSMENT: 0-10

## 2024-08-09 ASSESSMENT — PAIN SCALES - WONG BAKER: WONGBAKER_NUMERICALRESPONSE: NO HURT

## 2024-08-09 NOTE — PROGRESS NOTES
08/09/24 1525   Discharge Planning   Living Arrangements Spouse/significant other   Support Systems Spouse/significant other   Assistance Needed private caregivers 24/7, [employer] private pay, active Outpatient PT/OT/ ST at [facility]   Type of Residence Private residence   Home or Post Acute Services None   Expected Discharge Disposition Home   Does the patient need discharge transport arranged? No     Met with c/[facility], spouse/daughter to discuss discharge planning. Provider asked [facility] to educate family on Hospice. TCC gave Hospice education booklets and discussed with family. Daughter stated they want to discuss as a family. Discharge plan not stable, will need discharge plan prior to discharge. Provider aware

## 2024-08-09 NOTE — H&P
History Of Present Illness  Kate Kaye is a 83 y.o. frail appearing  female with a past medical hx of Lewy body dementia, anxiety and depression, presenting with altered mental status, and increased weakness. Pt is unable to provide history. Caregiver at bedside reports increased weakness and confusion as well as increasing difficulty with swallowing. Caregiver states they have changed to mechanical soft diet at home as well as crushing meds.    ED testing: CXR: mild interstitial edema, scott airspace opacities concerning for pneumonia/atelectasis  Labs: Na 133, BUN17, Cr 0.7, , total protein 6.1, trop 4 glucose 112 leuks 10.6  UA: 250 leuks, 1+ bacteria, 11-20 WBC, 250 leuk esterase cx pending   ED treatment: LR 1L, ceftriaxone 2 GM, azithromycin 500mg, MIV 0.9 @ 125 ml/hr  Admission to inpatient for PNA / ? UTI PT/OT eval and management      Past Medical History  She has no past medical history on file.    Surgical History  She has a past surgical history that includes MR angio neck wo IV contrast (7/1/2022); MR angio head wo IV contrast (7/1/2022); and CT angio abdomen pelvis w and or wo IV IV contrast (7/26/2023).     Social History  She has no history on file for tobacco use, alcohol use, and drug use.    Family History  No family history on file.     Allergies  Diphenhydramine    Review of Systems   Reason unable to perform ROS: Pt has severe dementia.        Physical Exam  Constitutional:       Appearance: She is ill-appearing.   HENT:      Head: Normocephalic and atraumatic.      Mouth/Throat:      Mouth: Mucous membranes are moist.   Eyes:      Extraocular Movements: Extraocular movements intact.   Cardiovascular:      Rate and Rhythm: Normal rate and regular rhythm.      Pulses: Normal pulses.      Heart sounds: Normal heart sounds.   Pulmonary:      Effort: Pulmonary effort is normal.      Breath sounds: Rales present.      Comments: R>L crackles at bases  Abdominal:      General: Bowel  sounds are normal.      Palpations: Abdomen is soft.   Musculoskeletal:         General: Normal range of motion.      Cervical back: Normal range of motion.   Skin:     General: Skin is warm and dry.      Capillary Refill: Capillary refill takes less than 2 seconds.   Neurological:      General: No focal deficit present.      Mental Status: She is alert and oriented to person, place, and time.   Psychiatric:         Mood and Affect: Mood normal.         Behavior: Behavior normal.          Last Recorded Vitals  /70 (BP Location: Right arm, Patient Position: Lying)   Pulse 73   Temp 36.6 °C (97.9 °F) (Temporal)   Resp 18   Wt 50.4 kg (111 lb 1.8 oz)   SpO2 94%     Relevant Results        Scheduled medications  [START ON 8/10/2024] azithromycin, 500 mg, intravenous, q24h  [START ON 8/10/2024] cefTRIAXone, 1 g, intravenous, q24h  enoxaparin, 40 mg, subcutaneous, Daily  oxygen, , inhalation, Continuous - Inhalation  pantoprazole, 40 mg, oral, Daily      Continuous medications  sodium chloride 0.9%, 10 mL/hr      PRN medications  PRN medications: sodium chloride 0.9%  Results for orders placed or performed during the hospital encounter of 08/08/24 (from the past 24 hour(s))   CBC and Auto Differential   Result Value Ref Range    WBC 10.6 4.4 - 11.3 x10*3/uL    nRBC 0.0 0.0 - 0.0 /100 WBCs    RBC 4.10 4.00 - 5.20 x10*6/uL    Hemoglobin 11.6 (L) 12.0 - 16.0 g/dL    Hematocrit 35.3 (L) 36.0 - 46.0 %    MCV 86 80 - 100 fL    MCH 28.3 26.0 - 34.0 pg    MCHC 32.9 32.0 - 36.0 g/dL    RDW 14.3 11.5 - 14.5 %    Platelets 293 150 - 450 x10*3/uL    Neutrophils % 80.7 40.0 - 80.0 %    Immature Granulocytes %, Automated 0.5 0.0 - 0.9 %    Lymphocytes % 11.4 13.0 - 44.0 %    Monocytes % 6.4 2.0 - 10.0 %    Eosinophils % 0.4 0.0 - 6.0 %    Basophils % 0.6 0.0 - 2.0 %    Neutrophils Absolute 8.56 (H) 1.60 - 5.50 x10*3/uL    Immature Granulocytes Absolute, Automated 0.05 0.00 - 0.50 x10*3/uL    Lymphocytes Absolute 1.21 0.80 -  3.00 x10*3/uL    Monocytes Absolute 0.68 0.05 - 0.80 x10*3/uL    Eosinophils Absolute 0.04 0.00 - 0.40 x10*3/uL    Basophils Absolute 0.06 0.00 - 0.10 x10*3/uL   Magnesium   Result Value Ref Range    Magnesium 2.12 1.60 - 2.40 mg/dL   Comprehensive metabolic panel   Result Value Ref Range    Glucose 112 (H) 74 - 99 mg/dL    Sodium 133 (L) 136 - 145 mmol/L    Potassium 3.7 3.5 - 5.3 mmol/L    Chloride 102 98 - 107 mmol/L    Bicarbonate 23 21 - 32 mmol/L    Anion Gap 12 10 - 20 mmol/L    Urea Nitrogen 17 6 - 23 mg/dL    Creatinine 0.70 0.50 - 1.05 mg/dL    eGFR 87 >60 mL/min/1.73m*2    Calcium 8.8 8.6 - 10.3 mg/dL    Albumin 3.6 3.4 - 5.0 g/dL    Alkaline Phosphatase 116 33 - 136 U/L    Total Protein 6.1 (L) 6.4 - 8.2 g/dL     (H) 9 - 39 U/L    Bilirubin, Total 0.4 0.0 - 1.2 mg/dL    ALT 27 7 - 45 U/L   Lipase   Result Value Ref Range    Lipase 4 (L) 9 - 82 U/L   Lactate   Result Value Ref Range    Lactate 1.1 0.4 - 2.0 mmol/L   Troponin I, High Sensitivity, Initial   Result Value Ref Range    Troponin I, High Sensitivity 4 0 - 13 ng/L   Sars-CoV-2 PCR   Result Value Ref Range    Coronavirus 2019, PCR Not Detected Not Detected   Troponin, High Sensitivity, 1 Hour   Result Value Ref Range    Troponin I, High Sensitivity 4 0 - 13 ng/L   ECG 12 lead   Result Value Ref Range    Ventricular Rate 75 BPM    Atrial Rate 75 BPM    WV Interval 164 ms    QRS Duration 96 ms    QT Interval 426 ms    QTC Calculation(Bazett) 475 ms    P Axis 54 degrees    R Axis -38 degrees    T Axis 48 degrees    QRS Count 12 beats    Q Onset 211 ms    P Onset 129 ms    P Offset 187 ms    T Offset 424 ms    QTC Fredericia 458 ms   Urinalysis with Reflex Culture and Microscopic   Result Value Ref Range    Color, Urine Light-Orange (N) Light-Yellow, Yellow, Dark-Yellow    Appearance, Urine Turbid (N) Clear    Specific Gravity, Urine 1.014 1.005 - 1.035    pH, Urine 6.5 5.0, 5.5, 6.0, 6.5, 7.0, 7.5, 8.0    Protein, Urine 10 (TRACE) NEGATIVE, 10  (TRACE), 20 (TRACE) mg/dL    Glucose, Urine Normal Normal mg/dL    Blood, Urine 0.03 (TRACE) (A) NEGATIVE    Ketones, Urine NEGATIVE NEGATIVE mg/dL    Bilirubin, Urine NEGATIVE NEGATIVE    Urobilinogen, Urine Normal Normal mg/dL    Nitrite, Urine NEGATIVE NEGATIVE    Leukocyte Esterase, Urine 250 Germán/µL (A) NEGATIVE   Microscopic Only, Urine   Result Value Ref Range    WBC, Urine 11-20 (A) 1-5, NONE /HPF    RBC, Urine 3-5 NONE, 1-2, 3-5 /HPF    Bacteria, Urine 1+ (A) NONE SEEN /HPF    Mucus, Urine FEW Reference range not established. /LPF   Green Top   Result Value Ref Range    Extra Tube Hold for add-ons.    Lavender Top   Result Value Ref Range    Extra Tube Hold for add-ons.      ECG 12 lead    Result Date: 8/9/2024  Normal sinus rhythm Left axis deviation Incomplete right bundle branch block Abnormal ECG When compared with ECG of 25-JUL-2024 14:11, No significant change was found    XR chest 1 view    Result Date: 8/9/2024  Interpreted By:  Bertha Way, STUDY: XR CHEST 1 VIEW;  8/8/2024 11:38 pm   INDICATION: Signs/Symptoms:cough/sob   COMPARISON: Rib series 07/25/2024.  chest x-ray 08/23/2023   ACCESSION NUMBER(S): [id number]   ORDERING CLINICIAN: BARRETT HYATT   TECHNIQUE: Portable frontal view of the chest was obtained .   FINDINGS: The cardiomediastinal silhouette is within normal limits. There is mild interstitial edema.   There are bibasilar airspace opacities. No pleural effusion or pneumothorax. Calcified granulomas at the right upper lobe.   Redemonstration of calcifications along the right-sided ventriculoperitoneal shunt catheter.       1.  Mild interstitial edema. Bibasilar airspace opacities, may be secondary to atelectasis or pneumonia.       MACRO: None.   Signed by: Bertha Way 8/9/2024 12:28 AM Dictation workstation:   [id number]    ECG 12 lead    Result Date: 7/26/2024  Normal sinus rhythm Left anterior fascicular block Abnormal ECG When compared with ECG of 25-JUL-2023 21:07,  Previous ECG has undetermined rhythm, needs review See ED provider note for full interpretation and clinical correlation Confirmed by Antoinette Chapin ([id number]) on 7/26/2024 11:18:50 AM    XR ribs 2 views left w chest pa or ap    Result Date: 7/25/2024  STUDY: Left Rib and Chest Radiographs; 7/25/2024 2:11 PM INDICATION: Chest wall pain; Status post fall between the rail and bed. COMPARISON: Chest radiograph 8/23/2023.  CT chest abdomen pelvis 7/25/2023. ACCESSION NUMBER(S): [id number] ORDERING CLINICIAN: CESILIA LEA TECHNIQUE:  Frontal chest and two views of the left ribs (five images obtained). FINDINGS:  CARDIOMEDIASTINAL SILHOUETTE: Cardiomediastinal silhouette is normal in size and configuration.  LUNGS: Lungs are clear.  Calcified granuloma are demonstrated.  There is chronic calcifications adjacent to the descending right sided ventriculoperitoneal shunt catheter.  ABDOMEN: No remarkable upper abdominal findings. LEFT RIBS:  There is no acute rib fracture. OTHER VISUALIZED BONES: No acute osseous changes.    No displaced rib fractures.  No acute cardiopulmonary disease. Signed by Zaki Armenta DO        Assessment/Plan   Principal Problem:    Pneumonia due to infectious organism, unspecified laterality, unspecified part of lung      Pneumonia (Community acquired bacterial)  - Mild interstitial edema. Bibasilar airspace opacities, may be  secondary to atelectasis or pneumonia  - ceftriaxone 2GM in ED   - ceftriaxone 1 GM (2) and Azithromycin (1) - then d/c'd due to drug interactions    - resp therapy consult   - O2 prn Sat < 90% - currently room air   - duo nebs  - guaifenesin prn  - speech consult - has been having increasing difficulty with swallowing at home per caregiver and diet has been changed to mech soft and crushed meds at home      Anxiety/Depression  - continue buspar, wellbutrin, lamictal, seroquel, elavil, ativan prn, fluoxetine, melatonin prn    Metabolic encephalopathy / Lewy Body  dementia   - continue namenda, aricept     Parkinson syndrome  - continue sinemet     Generalized weakness/ Deconditioning   - PT/OT consults   - pt has 24/7 caretaker - wheelchair bound - oob as able with assistance as able     Dispo: requires inpatient stay   Code status: Full code   DVT ppx: lovenox  PUD ppx: protonix (on at home)    Total accumulated time spent face to face and not face to face preparing to see the patient, obtaining and reviewing separately obtained history; performing a medically appropriate examination and/or evaluation; counseling and educating the patient, family; ordering medications, tests, or procedures; referring and communicating with other health care professionals; documenting clinical information in the patient's medical record; independently interpreting results and communicating the results to the patient, family; and care coordination was 45 minutes      PEPPER Powell-CNP  Attending Attestation:    I was present with the PEPPER-CNP who participated in the documentation of this note. I have personally seen and re-examined the patient and performed the medical decision-making components (assessment and plan of care). I have reviewed the documentation and verified the findings in the note as written with additions or exceptions as stated in the body of this note.    Dr. Lopez Gudino MD  Internal Medicine

## 2024-08-09 NOTE — ED TRIAGE NOTES
Patient brought from home by daughter for decreased mental status and SOB. Daughter stated that she has caregivers at the patients house taking care of patient and that she was not as alert and was not drinking or eating as much as usual. Vital signs are stable and daughter at the bedside.

## 2024-08-09 NOTE — PROGRESS NOTES
Physical Therapy    Physical Therapy Evaluation & Treatment    Patient Name: Kate Kaye  MRN: [id number]  Today's Date: 8/9/2024   Time Calculation  Start Time: 1238  Stop Time: 1311  Time Calculation (min): 33 min    Assessment/Plan   PT Assessment  PT Assessment Results: Decreased strength, Decreased range of motion, Decreased endurance, Impaired balance, Decreased mobility, Decreased cognition, Impaired judgement, Decreased safety awareness  Rehab Prognosis: Fair  Evaluation/Treatment Tolerance: Patient tolerated treatment well  Barriers to Participation: Ability to acquire knowledge, Comorbidities  End of Session Communication: Bedside nurse  End of Session Patient Position: Bed, 1 rail up   IP OR SWING BED PT PLAN  Inpatient or Swing Bed: Inpatient  PT Plan  Treatment/Interventions: Bed mobility, Transfer training, Range of motion, Therapeutic exercise  PT Plan: Ongoing PT  PT Frequency: 2 times per week  PT Discharge Recommendations: Low intensity level of continued care  PT Recommended Transfer Status: Assist x2  PT - OK to Discharge: Yes  Based on completed evaluation and care plan recommendations, no barriers to discharge to next site of care       Subjective     General Visit Information:  General  Reason for Referral: weakness  Referred By: Hugo AHN  Past Medical History Relevant to Rehab: past medical hx of Lewy body dementia, anxiety and depression, presenting with altered mental status, and increased weakness.  Family/Caregiver Present: Yes  Prior to Session Communication: Bedside nurse  Patient Position Received: Bed, 1 rail up    Home Living:  Home Living  Type of Home: House  Lives With: Spouse (24 hour caregiver)  Home Adaptive Equipment: Walker rolling or standard, Wheelchair-manual  Home Layout: One level  Home Access: Ramped entrance    Prior Level of Function:  Prior Function Per Pt/Caregiver Report  Level of [place]: Needs assistance with ADLs, Needs assistance with homemaking, Needs  assistance with functional transfers  Receives Help From: Primary caregiver  ADL Assistance: Needs assistance  Precautions:  Precautions  Medical Precautions: Fall precautions    Objective   Pain:  Pain Assessment  Pain Assessment: 0-10  0-10 (Numeric) Pain Score: 0 - No pain      Cognition:  Cognition  Overall Cognitive Status: Impaired at baseline  Orientation Level: Disoriented X4    General Assessments:     Patient demonstrated impaired mobility, balance, strength, range of motion, and ability to follow directions consistently. This is near patient's baseline level of function. At baseline patient requires assistance from caregivers for all mobility and ADLs.        Static Sitting Balance  Static Sitting-Balance Support: Bilateral upper extremity supported, Feet supported  Static Sitting-Level of Assistance: Moderate assistance    Static Standing Balance  Static Standing-Balance Support: Bilateral upper extremity supported  Static Standing-Level of Assistance: Maximum assistance    Functional Assessments:  Bed Mobility  Bed Mobility: Yes  Bed Mobility 1  Bed Mobility 1: Supine to sitting, Sitting to supine  Level of Assistance 1: Maximum assistance    Transfers  Transfer: Yes  Transfer 1  Transfer From 1: Bed to  Transfer to 1: Chair with arms  Technique 1: Stand pivot  Transfer Level of Assistance 1: Maximum assistance  Transfers 2  Transfer From 2: Chair with arms to  Transfer to 2: Bed  Technique 2: Stand pivot    Treatments:  Therapeutic Exercise  Therapeutic Exercise Performed: Yes  Therapeutic Exercise Activity 1: AP x10  Therapeutic Exercise Activity 2: heel slides x10  Therapeutic Exercise Activity 3: hip abd x10    Outcome Measures:  AMPAC Basic Mobility  Turning from your back to your side while in a flat bed without using bedrails: A lot  Moving from lying on your back to sitting on the side of a flat bed without using bedrails: A lot  Moving to and from bed to chair (including a wheelchair): A  lot  Standing up from a chair using your arms (e.g. wheelchair or bedside chair): A lot  To walk in hospital room: Total  Climbing 3-5 steps with railing: Total  Basic Mobility - Total Score: 10    Encounter Problems       Encounter Problems (Active)       Balance       STG - Maintains static standing balance with upper extremity support and Min A.       Start:  08/09/24    Expected End:  08/23/24       INTERVENTIONS:  1. Practice standing with minimal support.  2. Educate patient about standing tolerance.  3. Educate patient about independence with gait, transfers, and ADL's.  4. Educate patient about use of assistive device.  5. Educate patient about self-directed care.            PT Transfers       STG - Transfer from bed to chair with Mod A.       Start:  08/09/24    Expected End:  08/23/24            STG - Patient will perform bed mobility with Mod A.       Start:  08/09/24    Expected End:  08/23/24            STG - Patient will transfer sit to and from stand with mod A.       Start:  08/09/24    Expected End:  08/23/24                   Education Documentation  Home Exercise Program, taught by Daniel Lombardo, PT at 8/9/2024  3:38 PM.  Learner: Patient, Caregiver, Significant Other  Readiness: Eager  Method: Explanation, Demonstration  Response: Verbalizes Understanding    Mobility Training, taught by Daniel Lombardo, PT at 8/9/2024  3:38 PM.  Learner: Patient, Caregiver, Significant Other  Readiness: Eager  Method: Explanation, Demonstration  Response: Verbalizes Understanding    Education Comments  No comments found.

## 2024-08-09 NOTE — DISCHARGE INSTR - OTHER ORDERS
Thank you for choosing [facility] for your Health Care needs.  Also, thank you for allowing us to take you and your families preferences into account when determining your discharge plan.  Stay well!    Your Care Transitions Team Member:Holley Coulter Robin and Sadie [phone]    For questions about your medications listed on your discharge instructions, please call the Nurses Station at [phone].

## 2024-08-09 NOTE — PROGRESS NOTES
Speech-Language Pathology    SLP Adult Inpatient Speech-Language Pathology Clinical Swallow Evaluation    Patient Name: Kate Kaye  MRN: [id number]  Today's Date: 8/9/2024   Time Calculation  Start Time: 1135  Stop Time: 1200  Time Calculation (min): 25 min     Current Problem:   1. Pneumonia due to infectious organism, unspecified laterality, unspecified part of lung        2. Altered mental status, unspecified altered mental status type        3. Acute cystitis without hematuria        4. Failure to thrive in adult        5. Alzheimer's dementia without behavioral disturbance, psychotic disturbance, mood disturbance, or anxiety, unspecified dementia severity, unspecified timing of dementia onset (Multi)          Recommendations:      Solid Diet Recommendations : soft & bite sized/chopped (IDDSI Level 6)   Liquid Diet Recommendations: thin (IDDSI Level 0)   Compensatory Swallowing Strategies: Upright 90 degrees when eating/drinking, alternate solids and liquids, slow intake rate, control bite size    Plan:  Skilled speech therapy for dysphagia treatment is warranted in order to provide training and instruction regarding the use of compensatory swallow strategies and for further assessment of diet tolerance to minimize the risk of pulmonary compromise.  SLP Frequency: 3 x per week  Next Treatment Priority: Assessing diet tolerance, education and utilization of safe swallowing strategies.   Discussed POC: Patient   Discussed Risks/Benefits: Patient    Patient/Caregiver Agreeable: Yes      Goals:   Long term goal:  Pt will tolerate the safest, least restrictive diet level indicated by adequate intake and no overt s/s of aspiration/penetration.      Short term goals:  1. Patient will tolerate recommended diet indicated by adequate intake and no overt s/s of oral/pharyngeal dysphagia.  2. Patient will demonstrate utilization of safe swallowing compensatory strategies with min cues/prompts.    3. Pt will complete  oral/pharyngeal exercises with correct technique and at goal rep levels on 90% of trials.     IE - 8/9/2024, Goal target date: 8/24/2024      Assessment:  Patient Positioning: upright in bed  Baseline Vocal Quality: weak  Volitional Swallow: Within Functional Limits  Consistencies Trialed: Thin (IDDSI Level 0) - Straw, Soft & bite sized/chopped (IDDSI Level 6), Pureed (IDDSI Level 4)     Assessment included analysis of crushed medication given in applesauce via nurse. The pt tolerated this WFL with noted mild-mod increased oral prep and a-p transfer time, multiple swallows completed with need for a liquid wash noted.    The pt tolerated thin liquid straw sips WFL, no s/s of aspiration/penetration. The pt's caregiver reported that the pt controlled rate and sip size at much better levels during this assessment than at baseline.     SLP fed the pt diced peaches with most the juice drained.  She ate entire 4 oz container with no s/s of aspiration/penetration. Noted mild-mod increased mastication and a-p transfer time followed by multiple swallow per bolus.      Based on today's assessment, soft & bite sized/chopped (IDDSI Level 6) solids and Thin (IDDSI Level 0) liquids are recommended.    Oral/Motor Assessment:  Dentition: intact  Oral Motor: weak with decreased ROM    General Visit Information:  Living Environment: Pt resides at private residence with her . 24 hour caregivers.   Reason for Referral: The pt's caregiver reported decreased oral intake, increased pocketing of food and delayed swallow onset  Past Medical History: Parkinson's disease, dysphagia   Baseline Diet: Pt recently changed to mechanical soft solids/thin liquids from regular consistency solids.    Education:  Verbal Education : Results and recommendations.  Patient/caregiver reported understanding: yes  Plan of Care Discussed and Agreed Upon: yes  I

## 2024-08-09 NOTE — ED PROVIDER NOTES
Department of Emergency Medicine   ED  Provider Note  Admit Date/RoomTime: 8/8/2024 10:25 PM  ED Room: 09/09                  History of Present Illness:   Kate Kaye is a 83 y.o. female presenting to the ED for decreased mental status, cough, weakness, beginning last few days.  The complaint has been persistent, moderate in severity, and worsened by nothing.  Patient was treated for UTI about a week ago.  She has history of Lewy body dementia.  She has not been eating or drinking.  She has had decreased urine output.  She has been a little bit constipated they did try giving her enema without relief.  Patient is unable to provide any significant history secondary to her dementia.  No reported fever.  No vomiting.  No diarrhea.  History obtained from daughter (POA) Qian Gomez [phone]    Review of Systems:   Pertinent positives and review of systems as noted above.  Remaining 10 review of systems is negative or noncontributory to today's episode of care.  Review of Systems   A complete review of systems is otherwise unremarkable and is very limited secondary to the patient's advanced dementia.  History is obtained from the POA    --------------------------------------------- PAST HISTORY ---------------------------------------------  Past Medical History:  has no past medical history on file.  Alzheimers dementia, depression    Past Surgical History:  has a past surgical history that includes MR angio neck wo IV contrast (7/1/2022); MR angio head wo IV contrast (7/1/2022); and CT angio abdomen pelvis w and or wo IV IV contrast (7/26/2023).    Social History:  Lives at home with her  who has Parkinson's.  They have a caregiver living with them.  No tobacco use.  No alcohol use.  No drug use    Family History: family history is not on file. Unless otherwise noted, family history is non contributory    Patient's Medications   New Prescriptions    No medications on file   Previous Medications     PANTOPRAZOLE (PROTONIX) 40 MG EC TABLET    TAKE 1 TABLET BY MOUTH ONCE DAILY   Modified Medications    No medications on file   Discontinued Medications    No medications on file      The patient’s home medications have been reviewed.    Allergies: Diphenhydramine    -------------------------------------------------- RESULTS -------------------------------------------------  All laboratory and radiology results have been personally reviewed by myself   LABS:  Labs Reviewed   CBC WITH AUTO DIFFERENTIAL - Abnormal       Result Value    WBC 10.6      nRBC 0.0      RBC 4.10      Hemoglobin 11.6 (*)     Hematocrit 35.3 (*)     MCV 86      MCH 28.3      MCHC 32.9      RDW 14.3      Platelets 293      Neutrophils % 80.7      Immature Granulocytes %, Automated 0.5      Lymphocytes % 11.4      Monocytes % 6.4      Eosinophils % 0.4      Basophils % 0.6      Neutrophils Absolute 8.56 (*)     Immature Granulocytes Absolute, Automated 0.05      Lymphocytes Absolute 1.21      Monocytes Absolute 0.68      Eosinophils Absolute 0.04      Basophils Absolute 0.06     COMPREHENSIVE METABOLIC PANEL - Abnormal    Glucose 112 (*)     Sodium 133 (*)     Potassium 3.7      Chloride 102      Bicarbonate 23      Anion Gap 12      Urea Nitrogen 17      Creatinine 0.70      eGFR 87      Calcium 8.8      Albumin 3.6      Alkaline Phosphatase 116      Total Protein 6.1 (*)      (*)     Bilirubin, Total 0.4      ALT 27     LIPASE - Abnormal    Lipase 4 (*)     Narrative:     Venipuncture immediately after or during the administration of Metamizole may lead to falsely low results. Testing should be performed immediately prior to Metamizole dosing.   URINALYSIS WITH REFLEX CULTURE AND MICROSCOPIC - Abnormal    Color, Urine Light-Orange (*)     Appearance, Urine Turbid (*)     Specific Gravity, Urine 1.014      pH, Urine 6.5      Protein, Urine 10 (TRACE)      Glucose, Urine Normal      Blood, Urine 0.03 (TRACE) (*)     Ketones, Urine  NEGATIVE      Bilirubin, Urine NEGATIVE      Urobilinogen, Urine Normal      Nitrite, Urine NEGATIVE      Leukocyte Esterase, Urine 250 Germán/µL (*)    MICROSCOPIC ONLY, URINE - Abnormal    WBC, Urine 11-20 (*)     RBC, Urine 3-5      Bacteria, Urine 1+ (*)     Mucus, Urine FEW     MAGNESIUM - Normal    Magnesium 2.12     LACTATE - Normal    Lactate 1.1      Narrative:     Venipuncture immediately after or during the administration of Metamizole may lead to falsely low results. Testing should be performed immediately  prior to Metamizole dosing.   SARS-COV-2 PCR - Normal    Coronavirus 2019, PCR Not Detected      Narrative:     This assay has received FDA Emergency Use Authorization (EUA) and is only authorized for the duration of time that circumstances exist to justify the authorization of the emergency use of in vitro diagnostic tests for the detection of SARS-CoV-2 virus and/or diagnosis of COVID-19 infection under section 564(b)(1) of the Act, 21 U.S.C. 360bbb-3(b)(1). This assay is an in vitro diagnostic nucleic acid amplification test for the qualitative detection of SARS-CoV-2 from nasopharyngeal specimens and has been validated for use at [facility]. Negative results do not preclude COVID-19 infections and should not be used as the sole basis for diagnosis, treatment, or other management decisions.     SERIAL TROPONIN-INITIAL - Normal    Troponin I, High Sensitivity 4      Narrative:     Less than 99th percentile of normal range cutoff-  Female and children under 18 years old <14 ng/L; Male <21 ng/L: Negative  Repeat testing should be performed if clinically indicated.     Female and children under 18 years old 14-50 ng/L; Male 21-50 ng/L:  Consistent with possible cardiac damage and possible increased clinical   risk. Serial measurements may help to assess extent of myocardial damage.     >50 ng/L: Consistent with cardiac damage, increased clinical risk and  myocardial infarction.  Serial measurements may help assess extent of   myocardial damage.      NOTE: Children less than 1 year old may have higher baseline troponin   levels and results should be interpreted in conjunction with the overall   clinical context.     NOTE: Troponin I testing is performed using a different   testing methodology at [facility] than at other   [facility]. Direct result comparisons should only   be made within the same method.   SERIAL TROPONIN, 1 HOUR - Normal    Troponin I, High Sensitivity 4      Narrative:     Less than 99th percentile of normal range cutoff-  Female and children under 18 years old <14 ng/L; Male <21 ng/L: Negative  Repeat testing should be performed if clinically indicated.     Female and children under 18 years old 14-50 ng/L; Male 21-50 ng/L:  Consistent with possible cardiac damage and possible increased clinical   risk. Serial measurements may help to assess extent of myocardial damage.     >50 ng/L: Consistent with cardiac damage, increased clinical risk and  myocardial infarction. Serial measurements may help assess extent of   myocardial damage.      NOTE: Children less than 1 year old may have higher baseline troponin   levels and results should be interpreted in conjunction with the overall   clinical context.     NOTE: Troponin I testing is performed using a different   testing methodology at [facility] than at other   [facility]. Direct result comparisons should only   be made within the same method.   URINE CULTURE   TROPONIN SERIES- (INITIAL, 1 HR)    Narrative:     The following orders were created for panel order Troponin I Series, High Sensitivity (0, 1 HR).  Procedure                               Abnormality         Status                     ---------                               -----------         ------                     Troponin I, High Sensiti...[[id number]]  Normal              Final result               Troponin, High  Sensitivi...[[id number]]  Normal              Final result                 Please view results for these tests on the individual orders.   URINALYSIS WITH REFLEX CULTURE AND MICROSCOPIC    Narrative:     The following orders were created for panel order Urinalysis with Reflex Culture and Microscopic.  Procedure                               Abnormality         Status                     ---------                               -----------         ------                     Urinalysis with Reflex C...[[id number]]  Abnormal            Final result               Extra Urine Gray Tube[[id number]]                            In process                   Please view results for these tests on the individual orders.   EXTRA URINE GRAY TUBE         RADIOLOGY:  Interpreted by Radiologist.  XR chest 1 view   Final Result   1.  Mild interstitial edema. Bibasilar airspace opacities, may be   secondary to atelectasis or pneumonia.                  MACRO:   None.        Signed by: Bertha Way 8/9/2024 12:28 AM   Dictation workstation:   [id number]          Encounter Date: 07/25/24   ECG 12 lead   Result Value    Ventricular Rate 85    Atrial Rate 85    MD Interval 136    QRS Duration 102    QT Interval 402    QTC Calculation(Bazett) 478    P Axis 38    R Axis -46    T Axis 59    QRS Count 14    Q Onset 209    P Onset 141    P Offset 182    T Offset 410    QTC Fredericia 451    Narrative    Normal sinus rhythm  Left anterior fascicular block  Abnormal ECG  When compared with ECG of 25-JUL-2023 21:07,  Previous ECG has undetermined rhythm, needs review  See ED provider note for full interpretation and clinical correlation  Confirmed by Antoinette Chapin ([id number]) on 7/26/2024 11:18:50 AM     ------------------------- NURSING NOTES AND VITALS REVIEWED ---------------------------   The nursing notes within the ED encounter and vital signs as below have been reviewed.   /88 (BP Location: Right arm, Patient Position: Sitting)   Pulse  "66   Resp 18   Ht 1.651 m (5' 5\")   Wt 49.4 kg (109 lb)   SpO2 95%   BMI 18.14 kg/m²   Oxygen Saturation Interpretation: Normal      ---------------------------------------------------PHYSICAL EXAM--------------------------------------  Physical Exam  Vitals and nursing note reviewed.   Constitutional:       General: She is not in acute distress.     Appearance: She is well-developed. She is ill-appearing. She is not toxic-appearing.   HENT:      Head: Normocephalic and atraumatic.      Mouth/Throat:      Comments: Mucous membranes are pink and dry  Eyes:      General: No scleral icterus.     Extraocular Movements: Extraocular movements intact.      Conjunctiva/sclera: Conjunctivae normal.      Pupils: Pupils are equal, round, and reactive to light.   Cardiovascular:      Rate and Rhythm: Normal rate and regular rhythm.      Heart sounds: Normal heart sounds. No murmur heard.  Pulmonary:      Effort: Pulmonary effort is normal. No respiratory distress.      Breath sounds: No stridor. Rhonchi present. No wheezing or rales.      Comments: Occasional harsh cough  Abdominal:      General: There is no distension.      Palpations: Abdomen is soft.      Tenderness: There is no abdominal tenderness. There is no guarding.   Musculoskeletal:         General: No swelling.      Cervical back: Normal range of motion and neck supple. No rigidity.   Lymphadenopathy:      Cervical: No cervical adenopathy.   Skin:     General: Skin is warm and dry.      Capillary Refill: Capillary refill takes less than 2 seconds.      Findings: No rash.   Neurological:      Mental Status: She is lethargic, disoriented and confused.      GCS: GCS eye subscore is 4. GCS verbal subscore is 5. GCS motor subscore is 6.   Psychiatric:         Mood and Affect: Mood normal.            Procedures  NONE  ------------------------------ ED COURSE/MEDICAL DECISION MAKING----------------------    Medical Decision Making:   Patient was seen and examined by " me.  An IV was placed.  Appropriate labs been ordered.    Patient has generalized weakness and dementia.  I discussed findings with the patient and the patient's daughter.  Patient has been started on IV Rocephin and IV azithromycin to cover pneumonia in her urine also shows probable UTI.  I do believe the patient would benefit from hospitalization for IV fluid rehydration and IV antibiotics.  The patient and the patient's daughter are agreeable.  I will discuss with our hospitalist certified nurse practitioner Teresa Pate.  Patient been accepted for admission to the service of Dr. Gudino    ED Course as of 08/09/24 0331   Thu Aug 08, 2024   2338 EKG at 2324 interpreted by me at 2329.  Normal sinus rhythm 75 bpm.  Left axis deviation.   ms QRS 96 ms  ms.  No acute ST-T change.  Incomplete right bundle branch block.  No STEMI. [EC]   Fri Aug 09, 2024   0201 Comprehensive metabolic panel(!)  Sodium 133, potassium 3.7, chloride 102, bicarb 23, anion gap 12  BUN 17, creatinine 0.70  , total bilirubin 0.4, ALT 27 [EC]   0202 Troponin I Series, High Sensitivity (0, 1 HR)  First troponin 4, second troponin 4 [EC]   0202 Lactate  Lactate 1.1 [EC]   0202 Sars-CoV-2 PCR  Coronavirus is not detected [EC]   0202 Chest x-ray  IMPRESSION:  1.  Mild interstitial edema. Bibasilar airspace opacities, may be  secondary to atelectasis or pneumonia.   [EC]   0316 Urinalysis shows leukocyte Estrace and 11-20 white cells and 1+ bacteria concerning for UTI [EC]      ED Course User Index  [EC] Talha Moon,          Diagnoses as of 08/09/24 0331   Pneumonia due to infectious organism, unspecified laterality, unspecified part of lung   Altered mental status, unspecified altered mental status type   Acute cystitis without hematuria   Failure to thrive in adult   Alzheimer's dementia without behavioral disturbance, psychotic disturbance, mood disturbance, or anxiety, unspecified dementia severity, unspecified timing  of dementia onset (Multi)      Counseling:   The emergency provider has spoken with the patient and daughter/POA Qian Gomez  and discussed today’s results, in addition to providing specific details for the plan of care and counseling regarding the diagnosis and prognosis.  Questions are answered at this time and they are agreeable with the plan.      --------------------------------- IMPRESSION AND DISPOSITION ---------------------------------        IMPRESSION  1. Pneumonia due to infectious organism, unspecified laterality, unspecified part of lung    2. Altered mental status, unspecified altered mental status type    3. Acute cystitis without hematuria    4. Failure to thrive in adult    5. Alzheimer's dementia without behavioral disturbance, psychotic disturbance, mood disturbance, or anxiety, unspecified dementia severity, unspecified timing of dementia onset (Multi)        DISPOSITION  Disposition: Admit to telemetry to service of Dr. Gudino  Patient condition is stable      Billing Provider Critical Care Time: 0 minutes     Talha Moon,   08/09/24 0333

## 2024-08-10 LAB
ANION GAP SERPL CALC-SCNC: 12 MMOL/L (ref 10–20)
BASOPHILS # BLD AUTO: 0.05 X10*3/UL (ref 0–0.1)
BASOPHILS NFR BLD AUTO: 1 %
BUN SERPL-MCNC: 7 MG/DL (ref 6–23)
CALCIUM SERPL-MCNC: 8.3 MG/DL (ref 8.6–10.3)
CHLORIDE SERPL-SCNC: 104 MMOL/L (ref 98–107)
CO2 SERPL-SCNC: 24 MMOL/L (ref 21–32)
CREAT SERPL-MCNC: 0.58 MG/DL (ref 0.5–1.05)
EGFRCR SERPLBLD CKD-EPI 2021: >90 ML/MIN/1.73M*2
EOSINOPHIL # BLD AUTO: 0.22 X10*3/UL (ref 0–0.4)
EOSINOPHIL NFR BLD AUTO: 4.4 %
ERYTHROCYTE [DISTWIDTH] IN BLOOD BY AUTOMATED COUNT: 14.2 % (ref 11.5–14.5)
GLUCOSE SERPL-MCNC: 84 MG/DL (ref 74–99)
HCT VFR BLD AUTO: 35.1 % (ref 36–46)
HGB BLD-MCNC: 11.4 G/DL (ref 12–16)
IMM GRANULOCYTES # BLD AUTO: 0.01 X10*3/UL (ref 0–0.5)
IMM GRANULOCYTES NFR BLD AUTO: 0.2 % (ref 0–0.9)
LYMPHOCYTES # BLD AUTO: 1.16 X10*3/UL (ref 0.8–3)
LYMPHOCYTES NFR BLD AUTO: 23.2 %
MCH RBC QN AUTO: 27.9 PG (ref 26–34)
MCHC RBC AUTO-ENTMCNC: 32.5 G/DL (ref 32–36)
MCV RBC AUTO: 86 FL (ref 80–100)
MONOCYTES # BLD AUTO: 0.55 X10*3/UL (ref 0.05–0.8)
MONOCYTES NFR BLD AUTO: 11 %
NEUTROPHILS # BLD AUTO: 3.01 X10*3/UL (ref 1.6–5.5)
NEUTROPHILS NFR BLD AUTO: 60.2 %
NRBC BLD-RTO: 0 /100 WBCS (ref 0–0)
PLATELET # BLD AUTO: 284 X10*3/UL (ref 150–450)
POTASSIUM SERPL-SCNC: 3.4 MMOL/L (ref 3.5–5.3)
RBC # BLD AUTO: 4.09 X10*6/UL (ref 4–5.2)
SODIUM SERPL-SCNC: 137 MMOL/L (ref 136–145)
WBC # BLD AUTO: 5 X10*3/UL (ref 4.4–11.3)

## 2024-08-10 PROCEDURE — 2500000002 HC RX 250 W HCPCS SELF ADMINISTERED DRUGS (ALT 637 FOR MEDICARE OP, ALT 636 FOR OP/ED): Mod: IPSPLIT | Performed by: NURSE PRACTITIONER

## 2024-08-10 PROCEDURE — 97535 SELF CARE MNGMENT TRAINING: CPT | Mod: GO,IPSPLIT

## 2024-08-10 PROCEDURE — 2500000004 HC RX 250 GENERAL PHARMACY W/ HCPCS (ALT 636 FOR OP/ED): Mod: IPSPLIT | Performed by: NURSE PRACTITIONER

## 2024-08-10 PROCEDURE — 94760 N-INVAS EAR/PLS OXIMETRY 1: CPT | Mod: IPSPLIT

## 2024-08-10 PROCEDURE — 97166 OT EVAL MOD COMPLEX 45 MIN: CPT | Mod: GO,IPSPLIT

## 2024-08-10 PROCEDURE — 1100000001 HC PRIVATE ROOM DAILY: Mod: IPSPLIT

## 2024-08-10 PROCEDURE — 85025 COMPLETE CBC W/AUTO DIFF WBC: CPT | Mod: IPSPLIT | Performed by: NURSE PRACTITIONER

## 2024-08-10 PROCEDURE — 2500000001 HC RX 250 WO HCPCS SELF ADMINISTERED DRUGS (ALT 637 FOR MEDICARE OP): Mod: IPSPLIT | Performed by: NURSE PRACTITIONER

## 2024-08-10 PROCEDURE — 36415 COLL VENOUS BLD VENIPUNCTURE: CPT | Mod: IPSPLIT | Performed by: NURSE PRACTITIONER

## 2024-08-10 PROCEDURE — 99232 SBSQ HOSP IP/OBS MODERATE 35: CPT | Performed by: NURSE PRACTITIONER

## 2024-08-10 PROCEDURE — 80048 BASIC METABOLIC PNL TOTAL CA: CPT | Mod: IPSPLIT | Performed by: NURSE PRACTITIONER

## 2024-08-10 PROCEDURE — 2500000004 HC RX 250 GENERAL PHARMACY W/ HCPCS (ALT 636 FOR OP/ED): Mod: IPSPLIT

## 2024-08-10 RX ORDER — SODIUM CHLORIDE 9 MG/ML
INJECTION, SOLUTION INTRAVENOUS
Status: COMPLETED
Start: 2024-08-10 | End: 2024-08-10

## 2024-08-10 RX ORDER — POTASSIUM CHLORIDE 1.5 G/1.58G
20 POWDER, FOR SOLUTION ORAL 2 TIMES DAILY
Status: COMPLETED | OUTPATIENT
Start: 2024-08-10 | End: 2024-08-11

## 2024-08-10 SDOH — ECONOMIC STABILITY: TRANSPORTATION INSECURITY
IN THE PAST 12 MONTHS, HAS THE LACK OF TRANSPORTATION KEPT YOU FROM MEDICAL APPOINTMENTS OR FROM GETTING MEDICATIONS?: PATIENT UNABLE TO ANSWER

## 2024-08-10 SDOH — SOCIAL STABILITY: SOCIAL NETWORK: HOW OFTEN DO YOU ATTENT MEETINGS OF THE CLUB OR ORGANIZATION YOU BELONG TO?: PATIENT UNABLE TO ANSWER

## 2024-08-10 SDOH — SOCIAL STABILITY: SOCIAL INSECURITY: WITHIN THE LAST YEAR, HAVE YOU BEEN AFRAID OF YOUR PARTNER OR EX-PARTNER?: NO

## 2024-08-10 SDOH — ECONOMIC STABILITY: FOOD INSECURITY
WITHIN THE PAST 12 MONTHS, THE FOOD YOU BOUGHT JUST DIDN'T LAST AND YOU DIDN'T HAVE MONEY TO GET MORE.: PATIENT UNABLE TO ANSWER

## 2024-08-10 SDOH — ECONOMIC STABILITY: FOOD INSECURITY
WITHIN THE PAST 12 MONTHS, YOU WORRIED THAT YOUR FOOD WOULD RUN OUT BEFORE YOU GOT MONEY TO BUY MORE.: PATIENT UNABLE TO ANSWER

## 2024-08-10 SDOH — SOCIAL STABILITY: SOCIAL NETWORK: IN A TYPICAL WEEK, HOW MANY TIMES DO YOU TALK ON THE PHONE WITH FAMILY, FRIENDS, OR NEIGHBORS?: PATIENT UNABLE TO ANSWER

## 2024-08-10 SDOH — SOCIAL STABILITY: SOCIAL NETWORK
DO YOU BELONG TO ANY CLUBS OR ORGANIZATIONS SUCH AS CHURCH GROUPS UNIONS, FRATERNAL OR ATHLETIC GROUPS, OR SCHOOL GROUPS?: PATIENT UNABLE TO ANSWER

## 2024-08-10 SDOH — SOCIAL STABILITY: SOCIAL INSECURITY
WITHIN THE LAST YEAR, HAVE YOU BEEN KICKED, HIT, SLAPPED, OR OTHERWISE PHYSICALLY HURT BY YOUR PARTNER OR EX-PARTNER?: NO

## 2024-08-10 SDOH — SOCIAL STABILITY: SOCIAL INSECURITY
WITHIN THE LAST YEAR, HAVE TO BEEN RAPED OR FORCED TO HAVE ANY KIND OF SEXUAL ACTIVITY BY YOUR PARTNER OR EX-PARTNER?: NO

## 2024-08-10 SDOH — ECONOMIC STABILITY: INCOME INSECURITY
IN THE LAST 12 MONTHS, WAS THERE A TIME WHEN YOU WERE NOT ABLE TO PAY THE MORTGAGE OR RENT ON TIME?: PATIENT UNABLE TO ANSWER

## 2024-08-10 SDOH — ECONOMIC STABILITY: HOUSING INSECURITY: AT ANY TIME IN THE PAST 12 MONTHS, WERE YOU HOMELESS OR LIVING IN A SHELTER (INCLUDING NOW)?: PATIENT UNABLE TO ANSWER

## 2024-08-10 SDOH — SOCIAL STABILITY: SOCIAL NETWORK: HOW OFTEN DO YOU GET TOGETHER WITH FRIENDS OR RELATIVES?: PATIENT UNABLE TO ANSWER

## 2024-08-10 SDOH — HEALTH STABILITY: MENTAL HEALTH
STRESS IS WHEN SOMEONE FEELS TENSE, NERVOUS, ANXIOUS, OR CAN'T SLEEP AT NIGHT BECAUSE THEIR MIND IS TROUBLED. HOW STRESSED ARE YOU?: PATIENT UNABLE TO ANSWER

## 2024-08-10 SDOH — HEALTH STABILITY: MENTAL HEALTH
HOW OFTEN DO YOU NEED TO HAVE SOMEONE HELP YOU WHEN YOU READ INSTRUCTIONS, PAMPHLETS, OR OTHER WRITTEN MATERIAL FROM YOUR DOCTOR OR PHARMACY?: ALWAYS

## 2024-08-10 SDOH — HEALTH STABILITY: PHYSICAL HEALTH: ON AVERAGE, HOW MANY MINUTES DO YOU ENGAGE IN EXERCISE AT THIS LEVEL?: PATIENT UNABLE TO ANSWER

## 2024-08-10 SDOH — SOCIAL STABILITY: SOCIAL INSECURITY: WITHIN THE LAST YEAR, HAVE YOU BEEN HUMILIATED OR EMOTIONALLY ABUSED IN OTHER WAYS BY YOUR PARTNER OR EX-PARTNER?: NO

## 2024-08-10 SDOH — ECONOMIC STABILITY: INCOME INSECURITY: IN THE PAST 12 MONTHS, HAS THE ELECTRIC, GAS, OIL, OR WATER COMPANY THREATENED TO SHUT OFF SERVICE IN YOUR HOME?: NO

## 2024-08-10 SDOH — HEALTH STABILITY: PHYSICAL HEALTH
ON AVERAGE, HOW MANY DAYS PER WEEK DO YOU ENGAGE IN MODERATE TO STRENUOUS EXERCISE (LIKE A BRISK WALK)?: PATIENT UNABLE TO ANSWER

## 2024-08-10 SDOH — ECONOMIC STABILITY: TRANSPORTATION INSECURITY
IN THE PAST 12 MONTHS, HAS LACK OF TRANSPORTATION KEPT YOU FROM MEETINGS, WORK, OR FROM GETTING THINGS NEEDED FOR DAILY LIVING?: PATIENT UNABLE TO ANSWER

## 2024-08-10 SDOH — SOCIAL STABILITY: SOCIAL NETWORK: HOW OFTEN DO YOU ATTEND CHURCH OR RELIGIOUS SERVICES?: PATIENT UNABLE TO ANSWER

## 2024-08-10 SDOH — SOCIAL STABILITY: SOCIAL NETWORK: ARE YOU MARRIED, WIDOWED, DIVORCED, SEPARATED, NEVER MARRIED, OR LIVING WITH A PARTNER?: PATIENT UNABLE TO ANSWER

## 2024-08-10 ASSESSMENT — LIFESTYLE VARIABLES
HAVE YOU OR SOMEONE ELSE BEEN INJURED AS A RESULT OF YOUR DRINKING: NO
HOW MANY STANDARD DRINKS CONTAINING ALCOHOL DO YOU HAVE ON A TYPICAL DAY: PATIENT DOES NOT DRINK
AUDIT TOTAL SCORE: 0
AUDIT-C TOTAL SCORE: 0
HOW OFTEN DURING THE LAST YEAR HAVE YOU FOUND THAT YOU WERE NOT ABLE TO STOP DRINKING ONCE YOU HAD STARTED: NEVER
HOW OFTEN DURING THE LAST YEAR HAVE YOU BEEN UNABLE TO REMEMBER WHAT HAPPENED THE NIGHT BEFORE BECAUSE YOU HAD BEEN DRINKING: NEVER
HOW OFTEN DURING THE LAST YEAR HAVE YOU HAD A FEELING OF GUILT OR REMORSE AFTER DRINKING: NEVER
HOW OFTEN DURING THE LAST YEAR HAVE YOU NEEDED AN ALCOHOLIC DRINK FIRST THING IN THE MORNING TO GET YOURSELF GOING AFTER A NIGHT OF HEAVY DRINKING: NEVER
SKIP TO QUESTIONS 9-10: 1
HOW OFTEN DO YOU HAVE SIX OR MORE DRINKS ON ONE OCCASION: NEVER
HAS A RELATIVE, FRIEND, DOCTOR, OR ANOTHER HEALTH PROFESSIONAL EXPRESSED CONCERN ABOUT YOUR DRINKING OR SUGGESTED YOU CUT DOWN: NO
HOW OFTEN DO YOU HAVE A DRINK CONTAINING ALCOHOL: NEVER
HOW OFTEN DURING THE LAST YEAR HAVE YOU FAILED TO DO WHAT WAS NORMALLY EXPECTED FROM YOU BECAUSE OF DRINKING: NEVER

## 2024-08-10 ASSESSMENT — COGNITIVE AND FUNCTIONAL STATUS - GENERAL
TOILETING: TOTAL
EATING MEALS: A LOT
MOVING TO AND FROM BED TO CHAIR: TOTAL
DRESSING REGULAR UPPER BODY CLOTHING: TOTAL
HELP NEEDED FOR BATHING: TOTAL
WALKING IN HOSPITAL ROOM: TOTAL
EATING MEALS: A LITTLE
PERSONAL GROOMING: A LOT
WALKING IN HOSPITAL ROOM: TOTAL
DRESSING REGULAR UPPER BODY CLOTHING: A LOT
PERSONAL GROOMING: A LOT
MOBILITY SCORE: 9
MOVING FROM LYING ON BACK TO SITTING ON SIDE OF FLAT BED WITH BEDRAILS: A LOT
DRESSING REGULAR LOWER BODY CLOTHING: TOTAL
DAILY ACTIVITIY SCORE: 8
HELP NEEDED FOR BATHING: A LOT
DRESSING REGULAR UPPER BODY CLOTHING: TOTAL
TURNING FROM BACK TO SIDE WHILE IN FLAT BAD: A LOT
MOVING FROM LYING ON BACK TO SITTING ON SIDE OF FLAT BED WITH BEDRAILS: A LOT
STANDING UP FROM CHAIR USING ARMS: A LOT
MOBILITY SCORE: 10
HELP NEEDED FOR BATHING: TOTAL
DRESSING REGULAR LOWER BODY CLOTHING: TOTAL
DRESSING REGULAR LOWER BODY CLOTHING: A LOT
TURNING FROM BACK TO SIDE WHILE IN FLAT BAD: A LOT
STANDING UP FROM CHAIR USING ARMS: A LOT
CLIMB 3 TO 5 STEPS WITH RAILING: TOTAL
PERSONAL GROOMING: A LOT
MOVING TO AND FROM BED TO CHAIR: A LOT
CLIMB 3 TO 5 STEPS WITH RAILING: TOTAL
TOILETING: TOTAL
TOILETING: TOTAL
DAILY ACTIVITIY SCORE: 12
EATING MEALS: A LOT
DAILY ACTIVITIY SCORE: 8

## 2024-08-10 ASSESSMENT — ACTIVITIES OF DAILY LIVING (ADL)
BATHING_ASSISTANCE: TOTAL
HOME_MANAGEMENT_TIME_ENTRY: 16

## 2024-08-10 ASSESSMENT — PAIN - FUNCTIONAL ASSESSMENT
PAIN_FUNCTIONAL_ASSESSMENT: 0-10
PAIN_FUNCTIONAL_ASSESSMENT: 0-10

## 2024-08-10 ASSESSMENT — PAIN INTENSITY VAS: VAS_PAIN_BASICVITALS_IP: 0

## 2024-08-10 ASSESSMENT — PAIN SCALES - GENERAL
PAINLEVEL_OUTOF10: 0 - NO PAIN
PAINLEVEL_OUTOF10: 0 - NO PAIN

## 2024-08-10 ASSESSMENT — PAIN SCALES - WONG BAKER: WONGBAKER_NUMERICALRESPONSE: NO HURT

## 2024-08-10 NOTE — PROGRESS NOTES
Occupational Therapy    Evaluation/Treatment    Patient Name: Kate Kaye  MRN: [id number]  : 1940  Today's Date: 08/10/24  Time Calculation  Start Time: 912  Stop Time: 948  Time Calculation (min): 36 min       Assessment:  OT Assessment: Pt. displays a decline from her prior level of functioning for self feeding, balance & functional transfers due to her hospitalization for generalized weakness, pneumonia, metabolic encephalopathy & UTI.  OT to address these deficit areas to retore the pt. to her previous level of functioning to reduce caregiver level of assistance & for the pt.'s optimal level of functioning.  Prognosis: Good for stated goals  Barriers to Discharge: None  Evaluation/Treatment Tolerance: Patient tolerated treatment well  Medical Staff Made Aware: Yes  End of Session Communication: Bedside nurse  End of Session Patient Position: Up in chair, Alarm off, caregiver present  OT Assessment Results: Decreased ADL status, Decreased safe judgment during ADL, Decreased cognition, Decreased functional mobility, Decreased trunk control for functional activities  Prognosis:Good for stated goals  Barriers to Discharge: None  Evaluation/Treatment Tolerance: Patient tolerated treatment well  Medical Staff Made Aware: Yes  Strengths: Attitude of self, Support of Caregivers, Support of extended family/friends  Barriers to Participation: Comorbidities, Ability to acquire knowledge  Plan:  Treatment Interventions: ADL retraining, Cognitive reorientation, Patient/family training, Equipment evaluation/education, Neuromuscular reeducation, Compensatory technique education, Functional transfer training  OT Frequency: 3 times per week  OT Recommended Transfer Status: Maximum assist  OT - OK to Discharge: Yes Based on completed evaluation and care plan recommendations, no barriers to discharge to next site of care    Treatment Interventions: ADL retraining, Cognitive reorientation, Patient/family training,  Equipment evaluation/education, Neuromuscular reeducation, Compensatory technique education, Functional transfer training    Subjective   Current Problem:  1. Pneumonia due to infectious organism, unspecified laterality, unspecified part of lung        2. Altered mental status, unspecified altered mental status type        3. Acute cystitis without hematuria        4. Failure to thrive in adult        5. Alzheimer's dementia without behavioral disturbance, psychotic disturbance, mood disturbance, or anxiety, unspecified dementia severity, unspecified timing of dementia onset (Multi)          General:   OT Received On: 08/10/24  General  Reason for Referral: Impaired self care skills & functional transfers due to hospitalization for generalized weakness, pneumonia, metabolic encephalopathy & UTI.  Referred By: Maria Luisa Arias CNP  Past Medical History Relevant to Rehab: Lewy body dementia, depression, anxiety  Family/Caregiver Present: Pt.'s caregiver was pressent.  Caregiver Feedback: Caregiver provided information on the pt.'s prior level of functioning.  Prior to Session Communication: Bedside nurse  Patient Position Received: Up in chair, Alarm off, caregiver present  Preferred Learning Style: verbal, visual  General Comment: IV  Precautions:  Medical Precautions: Fall precautions  Precautions Comment: safety precautions  Vital Signs:  Heart Rate: 80  Heart Rate Source: Monitor  SpO2: 97 %  BP Method: Automatic  Patient Position: Sitting  Pain:  Pain Assessment  Pain Assessment: 0-10  0-10 (Numeric) Pain Score: 0 - No pain    Objective   Cognition:  Overall Cognitive Status: Pt. was pleasantly confused & cooperative.  Orientation Level: Oriented only to self at this time.  Memory: Impaired memory.  Dementia diagnosis.       Home Living:  Type of Home: House  Lives With: Her spouse & with 24 hour caregiver assistance  Home Adaptive Equipment: FWW, manual wheelchair  Home Layout: One level  Home Access: Ramped  entrance  Bathroom Shower/Tub: Has a modified tub with a door  Bathroom Toilet: Handicapped height  Bathroom Equipment: grab bars on both sides of the toilet & a grab bar along the entire bathroom wall, shower chair, grab bar in the tub.  Prior Function:  Level of [place]: Needs assistance with ADLs, Needs assistance with homemaking, Needs assistance with functional transfers  Receives Help From: Pt. has 24 hour private duty caregiver assistance  ADL Assistance: Pt. was able to feed her self with recent increased level of assistance needed for self feeding.  MaxA & cues for grooming.  Pt. is dependent for bathing, toileting & dressing.  Ambulation:Pt. uses a wheelchair for mobility & is dependent for wheelchair propulsion.  The pt. does walk into the bathroom with assistance while holding onto the grab bars.  ModA & cues needed for stand pivot transfers without a device.  Homemaking Assistance: Needs assistance  Leisure: Pt. likes to go out to eat & enjoys being outside.  Hand Dominance: Right  IADL History:     ADL:  Eating Assistance: Maximal  Eating Deficit: Setup, Verbal cueing, Scoop assist, Bringing food to mouth assist  Grooming Assistance: Maximal  Bathing Assistance: Total  UE Dressing Assistance: Total  LE Dressing Assistance: Total  Toileting Assistance with Device: Total  Activities of Daily Living: Feeding  Feeding Adaptive Equipment: Beige foam built up handles placed on utensil, dycem provided to stabilize the pt.'s bowl.  Feeding Level of Assistance: maxA & cues.  Pt. was able to scoop her food & load her utensil at times, however displayed inconsistencies with this.  MaxA & cues provided for task initiation & task follow through.  Pt. demonstrated increased ease with grasp of the utensil using built up handles on the utensil.  Feeding Where Assessed: bedside chair  Feeding Comments: Pt. was provided with one food item at time for increased attention to task.  Dycem was placed underneath the  pt.'s bowl for increased ease with stabilty of the bowl during self feeding.  A folded pillow was placed underneath the pt.'s R UE for increased ease with R hand to mouth movements. The pt.'s beverages were placed in a cup with a lid & straw.    Activity Tolerance:  Endurance: Endurance does not limit participation in activity     Bed Mobility/Transfers: Transfers  Transfer: Yes  Transfer 1  Technique 1: Sit to stand, Stand to sit, Stand pivot  Transfer Device 1: Gait belt  Transfer Level of Assistance 1: maxA & cues    Sitting Balance:  Static Sitting Balance  Static Sitting-Level of Assistance: S static sitting balance in a supported sitting surface.  Standing Balance:  Static Standing Balance  Static Standing-Level of Assistance: ModA & cues for static standing balance    Vision:Vision - Basic Assessment  Current Vision: Pt. is supposed to wear glasses, however doesn't per caregiver report.     Strength:  Strength Comments: B UE strength is grossly G-    Hand Function:  Hand Function  Gross Grasp: Functional  Coordination: fair-.  B UE tremors observed  Extremities:   RUE : Within Functional Limits   LUE: Within Functional Limits    Outcome Measures: [facility] Daily Activity  Putting on and taking off regular lower body clothing: Total  Bathing (including washing, rinsing, drying): Total  Putting on and taking off regular upper body clothing: Total  Toileting, which includes using toilet, bedpan or urinal: Total  Taking care of personal grooming such as brushing teeth: A lot  Eating Meals: A lot  Daily Activity - Total Score: 8    Education Documentation  Precautions, taught by Sharon Rush OT at 8/10/2024  3:58 PM.  Learner: Caregiver, Patient  Readiness: Acceptance  Method: Explanation, Demonstration  Response: Needs Reinforcement  Comment: OT POC.  Education on safety with transfers, use of adaptive equipment for self feeding & compensation techniques for increased ease with self feeding.    ADL Training,  taught by Sharon Rush OT at 8/10/2024  3:58 PM.  Learner: Caregiver, Patient  Readiness: Acceptance  Method: Explanation, Demonstration  Response: Needs Reinforcement  Comment: OT POC.  Education on safety with transfers, use of adaptive equipment for self feeding & compensation techniques for increased ease with self feeding.      Goals:  Encounter Problems       Encounter Problems (Active)       ADLs       Patient will feed self with intermittent Otto & cues with use of adaptive equipment/assistive devices as indicated.        Start:  08/10/24    Expected End:  08/17/24               BALANCE       Otto & cues for static standing balance as needed while caregiver assists the pt. with self care with A device for balance.       Start:  08/10/24    Expected End:  08/17/24               TRANSFERS       ModA & cues for transfers to the bed, chair & toilet.       Start:  08/10/24    Expected End:  08/17/24

## 2024-08-10 NOTE — CARE PLAN
Problem: Skin  Goal: Participates in plan/prevention/treatment measures  8/10/2024 0616 by Simran Salgado RN  Flowsheets (Taken 8/10/2024 0616)  Participates in plan/prevention/treatment measures:   Discuss with provider PT/OT consult   Elevate heels   Increase activity/out of bed for meals  8/10/2024 0615 by Simran Salgado RN  Outcome: Progressing  Goal: Prevent/manage excess moisture  8/10/2024 0616 by Simran Salgado RN  Flowsheets (Taken 8/10/2024 0616)  Prevent/manage excess moisture: Cleanse incontinence/protect with barrier cream  8/10/2024 0615 by Simran Salgado RN  Outcome: Progressing  Goal: Prevent/minimize sheer/friction injuries  8/10/2024 0616 by Simran Salgado RN  Flowsheets (Taken 8/10/2024 0616)  Prevent/minimize sheer/friction injuries:   Use pull sheet   HOB 30 degrees or less   Turn/reposition every 2 hours/use positioning/transfer devices  8/10/2024 0615 by Simran Salgado RN  Outcome: Progressing  Goal: Promote/optimize nutrition  Outcome: Progressing  Goal: Promote skin healing  Outcome: Progressing   The patient's goals for the shift include sleep    The clinical goals for the shift include Pt will have no falls this shift.    Over the shift, the patient did  make progress toward the following goals.

## 2024-08-10 NOTE — CARE PLAN
Problem: Skin  Goal: Participates in plan/prevention/treatment measures  Outcome: Progressing  Goal: Prevent/manage excess moisture  Outcome: Progressing  Goal: Prevent/minimize sheer/friction injuries  Outcome: Progressing  Goal: Promote/optimize nutrition  Outcome: Progressing  Goal: Promote skin healing  Outcome: Progressing   The patient's goals for the shift include sleep    The clinical goals for the shift include Pt will have no falls this shift.    Over the shift, the patient did  make progress toward the following goals.

## 2024-08-10 NOTE — NURSING NOTE
0725: assumed care of patient, observed personal care giver at bedside    0850: meds administered per MAR, patient accepted crushed with pudding.     0900: Assessment completed patient denies pain.    1200: Patient sitting up in chair, visiting with  and daughter.     1821: The patient's goals for the shift include to go home soon     The clinical goals for the shift include patient will maintain an SpO2 of 93% or higher this shift    Over the shift, the patient did make progress toward the following goals.

## 2024-08-10 NOTE — PROGRESS NOTES
Kate Kaye is a 83 y.o. female on day 1 of admission presenting with Pneumonia due to infectious organism, unspecified laterality, unspecified part of lung.      Subjective   Pt makes eye contact - verbiage is mumbled and unclear       Objective     Last Recorded Vitals  /75 (BP Location: Right arm, Patient Position: Lying)   Pulse 70   Temp 36.4 °C (97.6 °F) (Temporal)   Resp 16   Wt 50.4 kg (111 lb 1.8 oz)   SpO2 96%   Intake/Output last 3 Shifts:    Intake/Output Summary (Last 24 hours) at 8/10/2024 1114  Last data filed at 8/10/2024 1100  Gross per 24 hour   Intake 1691.25 ml   Output 1200 ml   Net 491.25 ml       Admission Weight  Weight: 49.4 kg (109 lb) (08/08/24 2227)    Daily Weight  08/09/24 : 50.4 kg (111 lb 1.8 oz)    Image Results  ECG 12 lead  Normal sinus rhythm  Left axis deviation  Incomplete right bundle branch block  Abnormal ECG  When compared with ECG of 25-JUL-2024 14:11,  No significant change was found  Confirmed by Jason Rousseau ([id number]) on 8/9/2024 12:59:39 PM  XR chest 1 view  Narrative: Interpreted By:  Bertha Way,   STUDY:  XR CHEST 1 VIEW;  8/8/2024 11:38 pm      INDICATION:  Signs/Symptoms:cough/sob      COMPARISON:  Rib series 07/25/2024.  chest x-ray 08/23/2023      ACCESSION NUMBER(S):  [id number]      ORDERING CLINICIAN:  BARRETT HYATT      TECHNIQUE:  Portable frontal view of the chest was obtained .      FINDINGS:  The cardiomediastinal silhouette is within normal limits. There is  mild interstitial edema.      There are bibasilar airspace opacities. No pleural effusion or  pneumothorax. Calcified granulomas at the right upper lobe.      Redemonstration of calcifications along the right-sided  ventriculoperitoneal shunt catheter.      Impression: 1.  Mild interstitial edema. Bibasilar airspace opacities, may be  secondary to atelectasis or pneumonia.              MACRO:  None.      Signed by: Bertha Way 8/9/2024 12:28 AM  Dictation workstation:    [id number]      Physical Exam  HENT:      Head: Normocephalic.      Mouth/Throat:      Mouth: Mucous membranes are moist.   Eyes:      Extraocular Movements: Extraocular movements intact.   Cardiovascular:      Rate and Rhythm: Normal rate and regular rhythm.      Pulses: Normal pulses.      Heart sounds: Normal heart sounds.   Pulmonary:      Breath sounds: Normal breath sounds.   Abdominal:      General: Bowel sounds are normal.      Palpations: Abdomen is soft.   Musculoskeletal:         General: Normal range of motion.   Skin:     General: Skin is warm and dry.      Capillary Refill: Capillary refill takes less than 2 seconds.   Neurological:      General: No focal deficit present.      Mental Status: She is alert. Mental status is at baseline.   Psychiatric:         Mood and Affect: Mood normal.       Relevant Results             amitriptyline, 50 mg, oral, Nightly  buPROPion XL, 300 mg, oral, Daily  busPIRone, 5 mg, oral, Nightly  carbidopa-levodopa, 1.5 tablet, oral, TID  cefTRIAXone, 1 g, intravenous, q24h  donepezil, 5 mg, oral, Nightly  enoxaparin, 40 mg, subcutaneous, Daily  FLUoxetine, 40 mg, oral, BID  lamoTRIgine, 200 mg, oral, Nightly  memantine, 20 mg, oral, Daily  oxygen, , inhalation, Continuous - Inhalation  pantoprazole, 40 mg, oral, Daily  polyethylene glycol, 17 g, oral, Daily  potassium chloride, 20 mEq, oral, BID  QUEtiapine, 37.5 mg, oral, Nightly  rosuvastatin, 5 mg, oral, Nightly      Results for orders placed or performed during the hospital encounter of 08/08/24 (from the past 24 hour(s))   Basic metabolic panel   Result Value Ref Range    Glucose 84 74 - 99 mg/dL    Sodium 137 136 - 145 mmol/L    Potassium 3.4 (L) 3.5 - 5.3 mmol/L    Chloride 104 98 - 107 mmol/L    Bicarbonate 24 21 - 32 mmol/L    Anion Gap 12 10 - 20 mmol/L    Urea Nitrogen 7 6 - 23 mg/dL    Creatinine 0.58 0.50 - 1.05 mg/dL    eGFR >90 >60 mL/min/1.73m*2    Calcium 8.3 (L) 8.6 - 10.3 mg/dL   CBC and Auto  Differential   Result Value Ref Range    WBC 5.0 4.4 - 11.3 x10*3/uL    nRBC 0.0 0.0 - 0.0 /100 WBCs    RBC 4.09 4.00 - 5.20 x10*6/uL    Hemoglobin 11.4 (L) 12.0 - 16.0 g/dL    Hematocrit 35.1 (L) 36.0 - 46.0 %    MCV 86 80 - 100 fL    MCH 27.9 26.0 - 34.0 pg    MCHC 32.5 32.0 - 36.0 g/dL    RDW 14.2 11.5 - 14.5 %    Platelets 284 150 - 450 x10*3/uL    Neutrophils % 60.2 40.0 - 80.0 %    Immature Granulocytes %, Automated 0.2 0.0 - 0.9 %    Lymphocytes % 23.2 13.0 - 44.0 %    Monocytes % 11.0 2.0 - 10.0 %    Eosinophils % 4.4 0.0 - 6.0 %    Basophils % 1.0 0.0 - 2.0 %    Neutrophils Absolute 3.01 1.60 - 5.50 x10*3/uL    Immature Granulocytes Absolute, Automated 0.01 0.00 - 0.50 x10*3/uL    Lymphocytes Absolute 1.16 0.80 - 3.00 x10*3/uL    Monocytes Absolute 0.55 0.05 - 0.80 x10*3/uL    Eosinophils Absolute 0.22 0.00 - 0.40 x10*3/uL    Basophils Absolute 0.05 0.00 - 0.10 x10*3/uL     ECG 12 lead    Result Date: 8/9/2024  Normal sinus rhythm Left axis deviation Incomplete right bundle branch block Abnormal ECG When compared with ECG of 25-JUL-2024 14:11, No significant change was found Confirmed by Jason Rousseau ([id number]) on 8/9/2024 12:59:39 PM    XR chest 1 view    Result Date: 8/9/2024  Interpreted By:  Bertha Way, STUDY: XR CHEST 1 VIEW;  8/8/2024 11:38 pm   INDICATION: Signs/Symptoms:cough/sob   COMPARISON: Rib series 07/25/2024.  chest x-ray 08/23/2023   ACCESSION NUMBER(S): [id number]   ORDERING CLINICIAN: BARRETT HYATT   TECHNIQUE: Portable frontal view of the chest was obtained .   FINDINGS: The cardiomediastinal silhouette is within normal limits. There is mild interstitial edema.   There are bibasilar airspace opacities. No pleural effusion or pneumothorax. Calcified granulomas at the right upper lobe.   Redemonstration of calcifications along the right-sided ventriculoperitoneal shunt catheter.       1.  Mild interstitial edema. Bibasilar airspace opacities, may be secondary to atelectasis or  pneumonia.       MACRO: None.   Signed by: Bertha Way 8/9/2024 12:28 AM Dictation workstation:   [id number]    ECG 12 lead    Result Date: 7/26/2024  Normal sinus rhythm Left anterior fascicular block Abnormal ECG When compared with ECG of 25-JUL-2023 21:07, Previous ECG has undetermined rhythm, needs review See ED provider note for full interpretation and clinical correlation Confirmed by Antoinette Chapin ([id number]) on 7/26/2024 11:18:50 AM    XR ribs 2 views left w chest pa or ap    Result Date: 7/25/2024  STUDY: Left Rib and Chest Radiographs; 7/25/2024 2:11 PM INDICATION: Chest wall pain; Status post fall between the rail and bed. COMPARISON: Chest radiograph 8/23/2023.  CT chest abdomen pelvis 7/25/2023. ACCESSION NUMBER(S): [id number] ORDERING CLINICIAN: CESILIA LEA TECHNIQUE:  Frontal chest and two views of the left ribs (five images obtained). FINDINGS:  CARDIOMEDIASTINAL SILHOUETTE: Cardiomediastinal silhouette is normal in size and configuration.  LUNGS: Lungs are clear.  Calcified granuloma are demonstrated.  There is chronic calcifications adjacent to the descending right sided ventriculoperitoneal shunt catheter.  ABDOMEN: No remarkable upper abdominal findings. LEFT RIBS:  There is no acute rib fracture. OTHER VISUALIZED BONES: No acute osseous changes.    No displaced rib fractures.  No acute cardiopulmonary disease. Signed by Zaki Armenta DO     Assessment/Plan      Principal Problem:    Pneumonia due to infectious organism, unspecified laterality, unspecified part of lung  Active Problems:    Frequent falls    Anxiety    Persistent depressive disorder    Parkinson's syndrome (Multi)    Lewy body dementia (Multi)    Pneumonia (Community acquired bacterial)  - Mild interstitial edema. Bibasilar airspace opacities, may be  secondary to atelectasis or pneumonia  - ceftriaxone 2GM in ED   - ceftriaxone 1 GM (3) and Azithromycin (1) - then d/c'd due to drug interactions    - resp therapy  consult   - O2 prn Sat < 90% - currently room air   - duo nebs  - guaifenesin prn  - speech consult - has been having increasing difficulty with swallowing at home per caregiver and diet has been changed to mech soft and crushed meds at home       UTI  - (+) blood, 250 leuks, 1+ bact., 11-20 WBC   - cx : > 100,000 enteric bacilli - on ceftriaxone     Anxiety/Depression  - continue buspar, wellbutrin, lamictal, seroquel, elavil, ativan prn, fluoxetine, melatonin prn  - slept poorly last night - some agitation today      Metabolic encephalopathy / Lewy Body dementia   - continue namenda, aricept      Parkinson syndrome  - continue sinemet      Generalized weakness/ Deconditioning   - PT/OT consults   - pt has 24/7 caretaker - wheelchair bound - oob as able with assistance as able      Discussed code status and hospice care with daughter - she is discussing with her Dad (pt )     Dispo: requires inpatient stay   Code status: Full code   DVT ppx: lovenox  PUD ppx: protonix (on at home)     Total accumulated time spent face to face and not face to face preparing to see the patient, obtaining and reviewing separately obtained history; performing a medically appropriate examination and/or evaluation; counseling and educating the patient, family; ordering medications, tests, or procedures; referring and communicating with other health care professionals; documenting clinical information in the patient's medical record; independently interpreting results and communicating the results to the patient, family; and care coordination was 45 minutes                 PEPPER Powell-CNP

## 2024-08-10 NOTE — CARE PLAN
Problem: Pain - Adult  Goal: Verbalizes/displays adequate comfort level or baseline comfort level  Outcome: Progressing     Problem: Safety - Adult  Goal: Free from fall injury  Outcome: Progressing     Problem: Discharge Planning  Goal: Discharge to home or other facility with appropriate resources  Outcome: Progressing     Problem: Chronic Conditions and Co-morbidities  Goal: Patient's chronic conditions and co-morbidity symptoms are monitored and maintained or improved  Outcome: Progressing   The patient's goals for the shift include sleep    The clinical goals for the shift include Pt will have no falls this shift.    Over the shift, the patient did  make progress toward the following goals.

## 2024-08-11 VITALS
OXYGEN SATURATION: 96 % | HEART RATE: 84 BPM | WEIGHT: 111.11 LBS | RESPIRATION RATE: 16 BRPM | BODY MASS INDEX: 23.97 KG/M2 | TEMPERATURE: 97.3 F | DIASTOLIC BLOOD PRESSURE: 71 MMHG | SYSTOLIC BLOOD PRESSURE: 132 MMHG | HEIGHT: 57 IN

## 2024-08-11 LAB
ANION GAP SERPL CALC-SCNC: 11 MMOL/L (ref 10–20)
BACTERIA UR CULT: ABNORMAL
BASOPHILS # BLD AUTO: 0.07 X10*3/UL (ref 0–0.1)
BASOPHILS NFR BLD AUTO: 1.4 %
BUN SERPL-MCNC: 9 MG/DL (ref 6–23)
CALCIUM SERPL-MCNC: 8.4 MG/DL (ref 8.6–10.3)
CHLORIDE SERPL-SCNC: 106 MMOL/L (ref 98–107)
CO2 SERPL-SCNC: 25 MMOL/L (ref 21–32)
CREAT SERPL-MCNC: 0.74 MG/DL (ref 0.5–1.05)
EGFRCR SERPLBLD CKD-EPI 2021: 81 ML/MIN/1.73M*2
EOSINOPHIL # BLD AUTO: 0.2 X10*3/UL (ref 0–0.4)
EOSINOPHIL NFR BLD AUTO: 4 %
ERYTHROCYTE [DISTWIDTH] IN BLOOD BY AUTOMATED COUNT: 14.1 % (ref 11.5–14.5)
FERRITIN SERPL-MCNC: 449 NG/ML (ref 8–150)
GLUCOSE SERPL-MCNC: 82 MG/DL (ref 74–99)
HCT VFR BLD AUTO: 35.6 % (ref 36–46)
HGB BLD-MCNC: 11.4 G/DL (ref 12–16)
IMM GRANULOCYTES # BLD AUTO: 0.02 X10*3/UL (ref 0–0.5)
IMM GRANULOCYTES NFR BLD AUTO: 0.4 % (ref 0–0.9)
IRON SATN MFR SERPL: 35 % (ref 25–45)
IRON SERPL-MCNC: 80 UG/DL (ref 35–150)
LYMPHOCYTES # BLD AUTO: 1.34 X10*3/UL (ref 0.8–3)
LYMPHOCYTES NFR BLD AUTO: 27 %
MCH RBC QN AUTO: 27.7 PG (ref 26–34)
MCHC RBC AUTO-ENTMCNC: 32 G/DL (ref 32–36)
MCV RBC AUTO: 87 FL (ref 80–100)
MONOCYTES # BLD AUTO: 0.62 X10*3/UL (ref 0.05–0.8)
MONOCYTES NFR BLD AUTO: 12.5 %
NEUTROPHILS # BLD AUTO: 2.72 X10*3/UL (ref 1.6–5.5)
NEUTROPHILS NFR BLD AUTO: 54.7 %
NRBC BLD-RTO: 0 /100 WBCS (ref 0–0)
PLATELET # BLD AUTO: 271 X10*3/UL (ref 150–450)
POTASSIUM SERPL-SCNC: 3.5 MMOL/L (ref 3.5–5.3)
RBC # BLD AUTO: 4.11 X10*6/UL (ref 4–5.2)
SODIUM SERPL-SCNC: 138 MMOL/L (ref 136–145)
TIBC SERPL-MCNC: 229 UG/DL (ref 240–445)
UIBC SERPL-MCNC: 149 UG/DL (ref 110–370)
WBC # BLD AUTO: 5 X10*3/UL (ref 4.4–11.3)

## 2024-08-11 PROCEDURE — 2500000004 HC RX 250 GENERAL PHARMACY W/ HCPCS (ALT 636 FOR OP/ED): Mod: IPSPLIT | Performed by: NURSE PRACTITIONER

## 2024-08-11 PROCEDURE — 2500000001 HC RX 250 WO HCPCS SELF ADMINISTERED DRUGS (ALT 637 FOR MEDICARE OP): Mod: IPSPLIT | Performed by: NURSE PRACTITIONER

## 2024-08-11 PROCEDURE — 82728 ASSAY OF FERRITIN: CPT | Mod: IPSPLIT | Performed by: NURSE PRACTITIONER

## 2024-08-11 PROCEDURE — 1100000001 HC PRIVATE ROOM DAILY: Mod: IPSPLIT

## 2024-08-11 PROCEDURE — 2500000005 HC RX 250 GENERAL PHARMACY W/O HCPCS: Mod: IPSPLIT | Performed by: NURSE PRACTITIONER

## 2024-08-11 PROCEDURE — 2500000004 HC RX 250 GENERAL PHARMACY W/ HCPCS (ALT 636 FOR OP/ED): Mod: IPSPLIT

## 2024-08-11 PROCEDURE — 94760 N-INVAS EAR/PLS OXIMETRY 1: CPT | Mod: IPSPLIT

## 2024-08-11 PROCEDURE — 83540 ASSAY OF IRON: CPT | Mod: IPSPLIT | Performed by: NURSE PRACTITIONER

## 2024-08-11 PROCEDURE — 80048 BASIC METABOLIC PNL TOTAL CA: CPT | Mod: IPSPLIT | Performed by: NURSE PRACTITIONER

## 2024-08-11 PROCEDURE — 36415 COLL VENOUS BLD VENIPUNCTURE: CPT | Mod: IPSPLIT | Performed by: NURSE PRACTITIONER

## 2024-08-11 PROCEDURE — 85025 COMPLETE CBC W/AUTO DIFF WBC: CPT | Mod: IPSPLIT | Performed by: NURSE PRACTITIONER

## 2024-08-11 PROCEDURE — 2500000002 HC RX 250 W HCPCS SELF ADMINISTERED DRUGS (ALT 637 FOR MEDICARE OP, ALT 636 FOR OP/ED): Mod: IPSPLIT | Performed by: NURSE PRACTITIONER

## 2024-08-11 PROCEDURE — 99232 SBSQ HOSP IP/OBS MODERATE 35: CPT | Performed by: NURSE PRACTITIONER

## 2024-08-11 RX ORDER — SODIUM CHLORIDE 9 MG/ML
INJECTION, SOLUTION INTRAVENOUS
Status: COMPLETED
Start: 2024-08-11 | End: 2024-08-11

## 2024-08-11 ASSESSMENT — COGNITIVE AND FUNCTIONAL STATUS - GENERAL
TURNING FROM BACK TO SIDE WHILE IN FLAT BAD: A LOT
WALKING IN HOSPITAL ROOM: A LOT
MOVING TO AND FROM BED TO CHAIR: A LOT
DRESSING REGULAR LOWER BODY CLOTHING: A LOT
EATING MEALS: A LOT
MOBILITY SCORE: 13
PERSONAL GROOMING: A LOT
DRESSING REGULAR UPPER BODY CLOTHING: A LOT
DAILY ACTIVITIY SCORE: 11
HELP NEEDED FOR BATHING: A LOT
MOVING FROM LYING ON BACK TO SITTING ON SIDE OF FLAT BED WITH BEDRAILS: A LITTLE
STANDING UP FROM CHAIR USING ARMS: A LOT
CLIMB 3 TO 5 STEPS WITH RAILING: A LOT
TOILETING: TOTAL

## 2024-08-11 ASSESSMENT — PAIN - FUNCTIONAL ASSESSMENT
PAIN_FUNCTIONAL_ASSESSMENT: 0-10
PAIN_FUNCTIONAL_ASSESSMENT: 0-10

## 2024-08-11 ASSESSMENT — PAIN SCALES - GENERAL
PAINLEVEL_OUTOF10: 0 - NO PAIN

## 2024-08-11 NOTE — CARE PLAN
Problem: Skin  Goal: Participates in plan/prevention/treatment measures  8/11/2024 0416 by Simran Salgado RN  Flowsheets (Taken 8/10/2024 1820 by Lala Vu RN)  Participates in plan/prevention/treatment measures: Elevate heels  8/11/2024 0415 by Simran Salgado RN  Outcome: Progressing  Goal: Prevent/manage excess moisture  8/11/2024 0416 by Simran Salgado RN  Flowsheets (Taken 8/11/2024 0416)  Prevent/manage excess moisture:   Cleanse incontinence/protect with barrier cream   Moisturize dry skin  8/11/2024 0415 by Simran Salgado RN  Outcome: Progressing  Goal: Prevent/minimize sheer/friction injuries  8/11/2024 0416 by Simran Salgado RN  Flowsheets (Taken 8/11/2024 0416)  Prevent/minimize sheer/friction injuries:   Use pull sheet   Increase activity/out of bed for meals   HOB 30 degrees or less   Turn/reposition every 2 hours/use positioning/transfer devices  8/11/2024 0415 by Simran Salgado RN  Outcome: Progressing  Goal: Promote/optimize nutrition  8/11/2024 0416 by Simran Salgado RN  Flowsheets (Taken 8/11/2024 0416)  Promote/optimize nutrition:   Assist with feeding   Monitor/record intake including meals  8/11/2024 0415 by Simran Salgado RN  Outcome: Progressing  Goal: Promote skin healing  8/11/2024 0416 by Simran Salgado RN  Flowsheets (Taken 8/11/2024 0416)  Promote skin healing:   Assess skin/pad under line(s)/device(s)   Protective dressings over bony prominences  8/11/2024 0415 by Simran Salgado RN  Outcome: Progressing   The patient's goals for the shift include sleep    The clinical goals for the shift include Pt will have no falls this shift.    Over the shift, the patient did make progress toward the following goals.

## 2024-08-11 NOTE — PROGRESS NOTES
Kate Kaye is a 83 y.o. female on day 2 of admission presenting with Pneumonia due to infectious organism, unspecified laterality, unspecified part of lung.      Subjective   Patient assessed at bedside; lying in bed. She complained of her legs aching. She has a cough; she denies fever, chills, N/V. Care giver at bedside.       Objective     Last Recorded Vitals  /77 (BP Location: Right arm, Patient Position: Lying)   Pulse 72   Temp 36.2 °C (97.2 °F) (Temporal)   Resp 16   Wt 50.4 kg (111 lb 1.8 oz)   SpO2 97%   Intake/Output last 3 Shifts:    Intake/Output Summary (Last 24 hours) at 8/11/2024 1132  Last data filed at 8/11/2024 1021  Gross per 24 hour   Intake 2485 ml   Output 650 ml   Net 1835 ml       Admission Weight  Weight: 49.4 kg (109 lb) (08/08/24 2227)    Daily Weight  08/09/24 : 50.4 kg (111 lb 1.8 oz)    Image Results      Physical Exam  Vitals reviewed.   Constitutional:       Appearance: Normal appearance. She is normal weight.   HENT:      Head: Normocephalic and atraumatic.      Right Ear: External ear normal.      Left Ear: External ear normal.      Nose: Nose normal.      Mouth/Throat:      Mouth: Mucous membranes are moist.      Pharynx: Oropharynx is clear.   Eyes:      Conjunctiva/sclera: Conjunctivae normal.      Pupils: Pupils are equal, round, and reactive to light.   Cardiovascular:      Rate and Rhythm: Normal rate and regular rhythm.      Pulses: Normal pulses.      Heart sounds: Normal heart sounds.   Pulmonary:      Effort: Pulmonary effort is normal.      Breath sounds: Rhonchi and rales present.   Abdominal:      General: Bowel sounds are normal.      Palpations: Abdomen is soft.   Musculoskeletal:         General: Normal range of motion.      Cervical back: Normal range of motion and neck supple.   Skin:     General: Skin is warm and dry.   Neurological:      General: No focal deficit present.      Mental Status: She is alert. Mental status is at baseline. She is  disoriented.   Psychiatric:         Mood and Affect: Mood normal.         Behavior: Behavior normal.         Relevant Results    Scheduled medications  amitriptyline, 50 mg, oral, Nightly  buPROPion XL, 300 mg, oral, Daily  busPIRone, 5 mg, oral, Nightly  carbidopa-levodopa, 1.5 tablet, oral, TID  cefTRIAXone, 1 g, intravenous, q24h  donepezil, 5 mg, oral, Nightly  enoxaparin, 40 mg, subcutaneous, Daily  FLUoxetine, 40 mg, oral, BID  lamoTRIgine, 200 mg, oral, Nightly  memantine, 20 mg, oral, Daily  oxygen, , inhalation, Continuous - Inhalation  pantoprazole, 40 mg, oral, Daily  polyethylene glycol, 17 g, oral, Daily  QUEtiapine, 37.5 mg, oral, Nightly  rosuvastatin, 5 mg, oral, Nightly      Continuous medications  sodium chloride 0.9%, 10 mL/hr  sodium chloride 0.9%, 75 mL/hr, Last Rate: 75 mL/hr (08/11/24 0611)      PRN medications  PRN medications: acetaminophen **OR** acetaminophen **OR** acetaminophen, guaiFENesin, ipratropium-albuteroL, LORazepam, melatonin, sodium chloride 0.9%      Results for orders placed or performed during the hospital encounter of 08/08/24 (from the past 24 hour(s))   Basic metabolic panel   Result Value Ref Range    Glucose 82 74 - 99 mg/dL    Sodium 138 136 - 145 mmol/L    Potassium 3.5 3.5 - 5.3 mmol/L    Chloride 106 98 - 107 mmol/L    Bicarbonate 25 21 - 32 mmol/L    Anion Gap 11 10 - 20 mmol/L    Urea Nitrogen 9 6 - 23 mg/dL    Creatinine 0.74 0.50 - 1.05 mg/dL    eGFR 81 >60 mL/min/1.73m*2    Calcium 8.4 (L) 8.6 - 10.3 mg/dL   CBC and Auto Differential   Result Value Ref Range    WBC 5.0 4.4 - 11.3 x10*3/uL    nRBC 0.0 0.0 - 0.0 /100 WBCs    RBC 4.11 4.00 - 5.20 x10*6/uL    Hemoglobin 11.4 (L) 12.0 - 16.0 g/dL    Hematocrit 35.6 (L) 36.0 - 46.0 %    MCV 87 80 - 100 fL    MCH 27.7 26.0 - 34.0 pg    MCHC 32.0 32.0 - 36.0 g/dL    RDW 14.1 11.5 - 14.5 %    Platelets 271 150 - 450 x10*3/uL    Neutrophils % 54.7 40.0 - 80.0 %    Immature Granulocytes %, Automated 0.4 0.0 - 0.9 %     Lymphocytes % 27.0 13.0 - 44.0 %    Monocytes % 12.5 2.0 - 10.0 %    Eosinophils % 4.0 0.0 - 6.0 %    Basophils % 1.4 0.0 - 2.0 %    Neutrophils Absolute 2.72 1.60 - 5.50 x10*3/uL    Immature Granulocytes Absolute, Automated 0.02 0.00 - 0.50 x10*3/uL    Lymphocytes Absolute 1.34 0.80 - 3.00 x10*3/uL    Monocytes Absolute 0.62 0.05 - 0.80 x10*3/uL    Eosinophils Absolute 0.20 0.00 - 0.40 x10*3/uL    Basophils Absolute 0.07 0.00 - 0.10 x10*3/uL                   Assessment/Plan        Principal Problem:    Pneumonia due to infectious organism, unspecified laterality, unspecified part of lung  Active Problems:    Frequent falls    Anxiety    Persistent depressive disorder    Parkinson's syndrome (Multi)    Lewy body dementia (Multi)    Pneumonia (Community acquired bacterial)  - CXR: Mild interstitial edema. Bibasilar airspace opacities, may be  secondary to atelectasis or pneumonia  - ceftriaxone 2GM in ED   - continue ceftriaxone 1 g daily; day 4  - discontinued Azithromycin  due to drug interactions    - RT for bronchial hygiene  - supplemental oxygen to keep SpO2 > 90%  - currently room air   - no oxygen at home  - continue duo nebs  - continue guaifenesin prn  - procalcitonin 0.14  - speech consult - has been having increasing difficulty with swallowing at home per caregiver and diet has been changed to mech soft and crushed meds at home       Acute cystitis with hematuria  - (+) blood, 250 leuks, 1+ bact., 11-20 WBC   - urine culture grew Klebsiella pneumoniae  - continue ceftriaxone 1 g daily    Anemia, stable  - iron studies pending  - monitor H&H    Hypokalemia, resolved  - K 3.4 > 3.5  - replaced with KCL  - monitor BMP     Anxiety/Depression  - continue bupropion 300 mg daily, buspirone 5 mg hs, lamictal 200 mg hs, quetiapine 37.5 mg hs, amitriptyline 50 mg hs, fluoxetine 40 mg BID  - continue lorazepam 1 mg BID, prn     Metabolic encephalopathy  Lewy Body dementia   Parkinson syndrome  - continue  carbidopa-levodopa  mg (1.5 tablet) TID; donepezil 5 mg hs; memantine 20 mg daily,     Generalized weakness/ Deconditioning   - PT/OT evaluation; recommending moderate level therapy  - pt has 24/7 caretaker - wheelchair bound - oob as able with assistance as able  - family is considering Hospice consult    PUD ppx  - continue pantoprazole 40 mg daily    DVT ppx  - continue enoxaparin 40 mg daily    Code Status: DNRCC-Arrest/DNI    Disposition: patient requires more than 2 inpatient days.                           Cheri Tsang, APRN-CNP

## 2024-08-11 NOTE — CARE PLAN
The patient's goals for the shift include sleep    The clinical goals for the shift include Pt will tolerate IV fluids throughout shift.    Patient remained safe and stable throughout shift. VS were stable and pt denied pain. IV fluids continue at 75ml/hr and pt tolerating well. Pt sat in chair all day and tolerated. No other complaints. Pt resting in chair with call light within reach. Family at bedside.

## 2024-08-11 NOTE — CARE PLAN
Problem: Pain - Adult  Goal: Verbalizes/displays adequate comfort level or baseline comfort level  Outcome: Progressing     Problem: Safety - Adult  Goal: Free from fall injury  Outcome: Progressing     Problem: Discharge Planning  Goal: Discharge to home or other facility with appropriate resources  Outcome: Progressing     Problem: Chronic Conditions and Co-morbidities  Goal: Patient's chronic conditions and co-morbidity symptoms are monitored and maintained or improved  Outcome: Progressing     Problem: Skin  Goal: Participates in plan/prevention/treatment measures  Outcome: Progressing  Goal: Prevent/manage excess moisture  Outcome: Progressing  Goal: Prevent/minimize sheer/friction injuries  Outcome: Progressing  Goal: Promote/optimize nutrition  Outcome: Progressing  Goal: Promote skin healing  Outcome: Progressing   The patient's goals for the shift include sleep    The clinical goals for the shift include Pt will have no falls this shift.    Over the shift, the patient did  make progress toward the following goals.

## 2024-08-12 VITALS
DIASTOLIC BLOOD PRESSURE: 65 MMHG | TEMPERATURE: 98.3 F | HEART RATE: 83 BPM | RESPIRATION RATE: 16 BRPM | OXYGEN SATURATION: 97 % | WEIGHT: 111.11 LBS | HEIGHT: 57 IN | BODY MASS INDEX: 23.97 KG/M2 | SYSTOLIC BLOOD PRESSURE: 121 MMHG

## 2024-08-12 LAB
ANION GAP SERPL CALC-SCNC: 12 MMOL/L (ref 10–20)
BASOPHILS # BLD AUTO: 0.08 X10*3/UL (ref 0–0.1)
BASOPHILS NFR BLD AUTO: 1.3 %
BUN SERPL-MCNC: 9 MG/DL (ref 6–23)
CALCIUM SERPL-MCNC: 8.7 MG/DL (ref 8.6–10.3)
CHLORIDE SERPL-SCNC: 105 MMOL/L (ref 98–107)
CO2 SERPL-SCNC: 25 MMOL/L (ref 21–32)
CREAT SERPL-MCNC: 0.74 MG/DL (ref 0.5–1.05)
EGFRCR SERPLBLD CKD-EPI 2021: 81 ML/MIN/1.73M*2
EOSINOPHIL # BLD AUTO: 0.23 X10*3/UL (ref 0–0.4)
EOSINOPHIL NFR BLD AUTO: 3.8 %
ERYTHROCYTE [DISTWIDTH] IN BLOOD BY AUTOMATED COUNT: 14 % (ref 11.5–14.5)
GLUCOSE SERPL-MCNC: 76 MG/DL (ref 74–99)
HCT VFR BLD AUTO: 38.8 % (ref 36–46)
HGB BLD-MCNC: 12.5 G/DL (ref 12–16)
HOLD SPECIMEN: NORMAL
IMM GRANULOCYTES # BLD AUTO: 0.03 X10*3/UL (ref 0–0.5)
IMM GRANULOCYTES NFR BLD AUTO: 0.5 % (ref 0–0.9)
LYMPHOCYTES # BLD AUTO: 2.2 X10*3/UL (ref 0.8–3)
LYMPHOCYTES NFR BLD AUTO: 36.7 %
MCH RBC QN AUTO: 28 PG (ref 26–34)
MCHC RBC AUTO-ENTMCNC: 32.2 G/DL (ref 32–36)
MCV RBC AUTO: 87 FL (ref 80–100)
MONOCYTES # BLD AUTO: 0.59 X10*3/UL (ref 0.05–0.8)
MONOCYTES NFR BLD AUTO: 9.8 %
NEUTROPHILS # BLD AUTO: 2.87 X10*3/UL (ref 1.6–5.5)
NEUTROPHILS NFR BLD AUTO: 47.9 %
NRBC BLD-RTO: 0 /100 WBCS (ref 0–0)
PLATELET # BLD AUTO: 312 X10*3/UL (ref 150–450)
POTASSIUM SERPL-SCNC: 3.5 MMOL/L (ref 3.5–5.3)
RBC # BLD AUTO: 4.46 X10*6/UL (ref 4–5.2)
SODIUM SERPL-SCNC: 138 MMOL/L (ref 136–145)
WBC # BLD AUTO: 6 X10*3/UL (ref 4.4–11.3)

## 2024-08-12 PROCEDURE — 36415 COLL VENOUS BLD VENIPUNCTURE: CPT | Mod: IPSPLIT | Performed by: NURSE PRACTITIONER

## 2024-08-12 PROCEDURE — 94760 N-INVAS EAR/PLS OXIMETRY 1: CPT | Mod: IPSPLIT

## 2024-08-12 PROCEDURE — 2500000004 HC RX 250 GENERAL PHARMACY W/ HCPCS (ALT 636 FOR OP/ED): Mod: IPSPLIT

## 2024-08-12 PROCEDURE — 85025 COMPLETE CBC W/AUTO DIFF WBC: CPT | Mod: IPSPLIT | Performed by: NURSE PRACTITIONER

## 2024-08-12 PROCEDURE — 2500000004 HC RX 250 GENERAL PHARMACY W/ HCPCS (ALT 636 FOR OP/ED): Mod: IPSPLIT | Performed by: NURSE PRACTITIONER

## 2024-08-12 PROCEDURE — 9420000001 HC RT PATIENT EDUCATION 5 MIN: Mod: IPSPLIT

## 2024-08-12 PROCEDURE — 99239 HOSP IP/OBS DSCHRG MGMT >30: CPT

## 2024-08-12 PROCEDURE — 2500000001 HC RX 250 WO HCPCS SELF ADMINISTERED DRUGS (ALT 637 FOR MEDICARE OP): Mod: IPSPLIT | Performed by: NURSE PRACTITIONER

## 2024-08-12 PROCEDURE — 92526 ORAL FUNCTION THERAPY: CPT | Mod: GN,IPSPLIT | Performed by: SPEECH-LANGUAGE PATHOLOGIST

## 2024-08-12 PROCEDURE — 80048 BASIC METABOLIC PNL TOTAL CA: CPT | Mod: IPSPLIT | Performed by: NURSE PRACTITIONER

## 2024-08-12 PROCEDURE — 2500000004 HC RX 250 GENERAL PHARMACY W/ HCPCS (ALT 636 FOR OP/ED): Mod: IPSPLIT | Performed by: INTERNAL MEDICINE

## 2024-08-12 RX ORDER — SODIUM CHLORIDE 9 MG/ML
INJECTION, SOLUTION INTRAVENOUS
Status: COMPLETED
Start: 2024-08-12 | End: 2024-08-12

## 2024-08-12 RX ORDER — CEFUROXIME AXETIL 500 MG/1
500 TABLET ORAL 2 TIMES DAILY
Qty: 6 TABLET | Refills: 0 | Status: SHIPPED | OUTPATIENT
Start: 2024-08-12 | End: 2024-08-15

## 2024-08-12 RX ORDER — LORAZEPAM 0.5 MG/1
0.5 TABLET ORAL 2 TIMES DAILY PRN
Status: DISCONTINUED | OUTPATIENT
Start: 2024-08-12 | End: 2024-08-12 | Stop reason: HOSPADM

## 2024-08-12 ASSESSMENT — COGNITIVE AND FUNCTIONAL STATUS - GENERAL
STANDING UP FROM CHAIR USING ARMS: TOTAL
DRESSING REGULAR UPPER BODY CLOTHING: A LOT
MOVING TO AND FROM BED TO CHAIR: TOTAL
DAILY ACTIVITIY SCORE: 7
EATING MEALS: TOTAL
DRESSING REGULAR LOWER BODY CLOTHING: TOTAL
TOILETING: TOTAL
HELP NEEDED FOR BATHING: TOTAL
WALKING IN HOSPITAL ROOM: TOTAL
MOBILITY SCORE: 8
CLIMB 3 TO 5 STEPS WITH RAILING: TOTAL
PERSONAL GROOMING: TOTAL
TURNING FROM BACK TO SIDE WHILE IN FLAT BAD: A LOT
MOVING FROM LYING ON BACK TO SITTING ON SIDE OF FLAT BED WITH BEDRAILS: A LOT

## 2024-08-12 ASSESSMENT — PAIN SCALES - GENERAL
PAINLEVEL_OUTOF10: 0 - NO PAIN
PAINLEVEL_OUTOF10: 0 - NO PAIN

## 2024-08-12 ASSESSMENT — PAIN - FUNCTIONAL ASSESSMENT
PAIN_FUNCTIONAL_ASSESSMENT: 0-10
PAIN_FUNCTIONAL_ASSESSMENT: 0-10

## 2024-08-12 NOTE — PROGRESS NOTES
08/12/24 1531   Discharge Planning   Assistance Needed Qian at bedside, spoke about wanting her mother to be discharged to home with her 24 hour care and then have the meeting with [facility] to discuss hospice. Provider aware, discharge order placed. IMM obtained. [facility] made aware.

## 2024-08-12 NOTE — PROGRESS NOTES
Speech-Language Pathology    [facility] Adult Inpatient  Speech-Language Pathology Treatment     Patient Name: Kate Kaye  MRN: [id number]  Today's Date: 8/12/2024  Time Calculation  Start Time: 1250  Stop Time: 1300  Time Calculation (min): 10 min     Current Problem:   1. Pneumonia due to infectious organism, unspecified laterality, unspecified part of lung        2. Altered mental status, unspecified altered mental status type        3. Acute cystitis without hematuria        4. Failure to thrive in adult        5. Alzheimer's dementia without behavioral disturbance, psychotic disturbance, mood disturbance, or anxiety, unspecified dementia severity, unspecified timing of dementia onset (Multi)          Assessed: Dysphagia    Subjective   Pt reporting just finishing lunch, feeling full and tired.      Pain Assessment:  No pain reported impacting therapy session.      Objective:  Pt's lunch consisted of a quesadilla, fresh fruit, and lemon meringue pie. The pt's caregiver reported that they requested a diet upgrade back to Regular (IDDSI Level 7) solids due to limited choices on soft & bite sized/chopped (IDDSI Level 6) solids. She reported that they know what the pt can tolerate and someone is always with her. SLP recommends pt choose naturally soft foods that she likes as much as possible, such a cream pie v.s. a cookie, mashed potatoes v.s. french fries. Safe swallowing compensatory strategy education was completed and practiced with straw sips of thin liquids, independently the pt took multiple sips which resulting in a cough response. She was able to tolerate single sips with no s/s of aspiration/penetration when SLP controlled rate, sips size and provided cues for chin tuck v.s. chin back on pill. The pt and her  reported good understanding of safe swallowing compensatory strategies.       Recommendations:  Continue current diet level per pt/caregiver request, choose easy to chew foods as much as possible,  continue to follow safe swallowing compensatory strategies.      Goals:   Long term goal:  Pt will tolerate the safest, least restrictive diet level indicated by adequate intake and no overt s/s of aspiration/penetration.      Short term goals:  1. Patient will tolerate recommended diet indicated by adequate intake and no overt s/s of oral/pharyngeal dysphagia.  2. Patient will demonstrate utilization of safe swallowing compensatory strategies with min cues/prompts.    3. Pt will complete oral/pharyngeal exercises with correct technique and at goal rep levels on 90% of trials.      IE - 8/9/2024, Goal target date: 8/24/2024     Plan:  Continue per POC.     Education Documentation  Education provided to pt and her  which included recommendations, therapy techniques and compensatory strategies. Pt reported agreement and good understanding.

## 2024-08-12 NOTE — CARE PLAN
The patient's goals for the shift include sleep    The clinical goals for the shift include Patient will tolerate IV antibiotics throughout shift.    1925 Patient sitting in chair. Family at bedside. Assumed care of patient. Assessment completed as charted. Denies pain or complaints at this time. Call light within reach.    2030 In to give PM meds. Assisted patient to bed. Incontinent of urine. Patient changed, scott care completed, and new purwick placed. Denies complaints. Sitter from home at bedside. Call light within reach.    2330 Patient sleeping, no signs or distress noted. Call light within reach.

## 2024-08-12 NOTE — DISCHARGE SUMMARY
Discharge Diagnosis  Pneumonia due to infectious organism, unspecified laterality, unspecified part of lung    Issues Requiring Follow-Up  Follow up with PCP     Discharge Meds     Your medication list        START taking these medications        Instructions Last Dose Given Next Dose Due   cefuroxime 500 mg tablet  Commonly known as: Ceftin      Take 1 tablet (500 mg) by mouth 2 times a day for 3 days.              CONTINUE taking these medications        Instructions Last Dose Given Next Dose Due   amitriptyline 25 mg tablet  Commonly known as: Elavil           Aricept 5 mg tablet  Generic drug: donepezil           buPROPion  mg 24 hr tablet  Commonly known as: Wellbutrin XL           busPIRone 5 mg tablet  Commonly known as: Buspar           carbidopa-levodopa  mg tablet  Commonly known as: Sinemet           FLUoxetine 40 mg capsule  Commonly known as: PROzac           lamoTRIgine 200 mg tablet  Commonly known as: LaMICtal           LORazepam 1 mg tablet  Commonly known as: Ativan           melatonin 3 mg tablet           memantine 5 mg tablet  Commonly known as: Namenda           pantoprazole 40 mg EC tablet  Commonly known as: ProtoNix      TAKE 1 TABLET BY MOUTH ONCE DAILY       polyethylene glycol 17 gram packet  Commonly known as: Glycolax, Miralax           QUEtiapine 25 mg tablet  Commonly known as: SEROquel           rosuvastatin 5 mg tablet  Commonly known as: Crestor                  STOP taking these medications      cephalexin 500 mg capsule  Commonly known as: Keflex        lidocaine 5 % patch  Commonly known as: Lidoderm                  Where to Get Your Medications        These medications were sent to [id number] - [facility] [address] AT [facility] [address]  [facility] [place], [facility] [address]      Phone: [phone]   cefuroxime 500 mg tablet         Test Results Pending At Discharge  Pending Labs       No current pending labs.            [facility]  Course   Kate Kaye is a 83 y.o. frail appearing  female with a past medical hx of Lewy body dementia, anxiety and depression, presenting with altered mental status, and increased weakness. Pt is unable to provide history. Caregiver at bedside reports increased weakness and confusion as well as increasing difficulty with swallowing. Caregiver states they have changed to mechanical soft diet at home as well as crushing meds.     Hospital Course:  Pneumonia (Community acquired bacterial)  - CXR: Mild interstitial edema. Bibasilar airspace opacities, may be  secondary to atelectasis or pneumonia  - ceftriaxone 2GM in ED   - continue ceftriaxone 1 g daily; day 4  - discontinued Azithromycin  due to drug interactions    - RT for bronchial hygiene  - supplemental oxygen to keep SpO2 > 90%  - currently room air   - no oxygen at home  - continue duo nebs  - continue guaifenesin prn  - procalcitonin 0.14  - speech consult - has been having increasing difficulty with swallowing at home per caregiver and diet has been changed to mech soft and crushed meds at home       Acute cystitis with hematuria  - (+) blood, 250 leuks, 1+ bact., 11-20 WBC   - urine culture grew Klebsiella pneumoniae  - continue ceftriaxone 1 g daily     Anemia, stable  - iron studies pending  - monitor H&H     Hypokalemia, resolved  - K 3.4 > 3.5  - replaced with KCL  - monitor BMP     Anxiety/Depression  - continue bupropion 300 mg daily, buspirone 5 mg hs, lamictal 200 mg hs, quetiapine 37.5 mg hs, amitriptyline 50 mg hs, fluoxetine 40 mg BID  - continue lorazepam 1 mg BID, prn     Metabolic encephalopathy  Lewy Body dementia   Parkinson syndrome  - continue carbidopa-levodopa  mg (1.5 tablet) TID; donepezil 5 mg hs; memantine 20 mg daily,     Generalized weakness/ Deconditioning   - PT/OT evaluation; recommending moderate level therapy  - pt has 24/7 caretaker - wheelchair bound - oob as able with assistance as able  - family is  considering Hospice consult     PUD ppx  - continue pantoprazole 40 mg daily     DVT ppx  - continue enoxaparin 40 mg daily     Code Status: DNRCC-Arrest/DNI     Disposition: Pt stable to discharge home on oral abx. Daughter wants to wait until they get home to consult with Hospice.     Total cumulative time spent in preparation of this discharge including documentation review, coordination of care with the medical team including PT/SW/care coordinators and treating consultants, discussion with patient and pertinent family members and finalization of prescriptions, followup appointments and this discharge summary was approximately  45 minutes. Over 50% of the time was spent face-to-face counseling the patient on diagnosis, prescriptions, and follow up appointments.     Pertinent Physical Exam At Time of Discharge  Physical Exam  Vitals reviewed.   Constitutional:       Appearance: She is ill-appearing.   HENT:      Head: Normocephalic and atraumatic.      Right Ear: External ear normal.      Left Ear: External ear normal.      Nose: Nose normal.      Mouth/Throat:      Pharynx: Oropharynx is clear.   Eyes:      Conjunctiva/sclera: Conjunctivae normal.   Cardiovascular:      Rate and Rhythm: Normal rate and regular rhythm.      Pulses: Normal pulses.      Heart sounds: Normal heart sounds.   Pulmonary:      Breath sounds: Rhonchi present.   Abdominal:      General: Bowel sounds are normal.      Palpations: Abdomen is soft.   Musculoskeletal:         General: Normal range of motion.      Cervical back: Normal range of motion and neck supple.   Skin:     General: Skin is dry.   Neurological:      General: No focal deficit present.      Mental Status: She is alert. She is disoriented.   Psychiatric:         Mood and Affect: Mood normal.         Behavior: Behavior normal.         Outpatient Follow-Up  No future appointments.      PEPPER Otero-CNP

## 2024-08-12 NOTE — PROGRESS NOTES
08/12/24 1025   Discharge Planning   Assistance Needed Spoke with daughter, Qian, stated she would like to consult with [facility]. Provider made aware. Referral sent.   Who is requesting discharge planning? Provider   Home or Post Acute Services Community services   Expected Discharge Disposition Hospice   Patient Choice   Provider Choice list and CMS website (https://medicare.gov/care-compare#search) for post-acute Quality and Resource Measure Data were provided and reviewed with: Family   Patient / Family choosing to utilize agency / facility established prior to hospitalization No

## 2024-08-12 NOTE — CARE PLAN
The patient's goals for the shift include sleep    The clinical goals for the shift include Patient will tolerate IV antibiotics throughout shift.    Over the shift, the patient did not make progress toward the following goals. Barriers to progression include worsening symptoms of pneumonia. Recommendations to address these barriers include assess patient treatment plan if no improvements in symptoms.

## 2024-08-12 NOTE — PROGRESS NOTES
08/12/24 1537   Discharge Planning   Who is requesting discharge planning? Provider   Home or Post Acute Services None   Expected Discharge Disposition Home   Does the patient need discharge transport arranged? No

## 2024-08-12 NOTE — CARE PLAN
The patient's goals for the shift include sleep    The clinical goals for the shift include Pt will tolerate IV fluids throughout shift.    Patient discharged home. Discharge instructions were reviewed with daughter. Daughter verbally understood discharge instructions and had no questions or concerns. VS were stable and IV was discontinued and intact. No other complaints. Pt transferred off floor via wheelchair to her ride.

## 2024-08-13 ENCOUNTER — APPOINTMENT (OUTPATIENT)
Dept: SPEECH THERAPY | Facility: HOSPITAL | Age: 84
End: 2024-08-13
Payer: MEDICARE

## 2024-08-13 ENCOUNTER — APPOINTMENT (OUTPATIENT)
Dept: PHYSICAL THERAPY | Facility: HOSPITAL | Age: 84
End: 2024-08-13
Payer: MEDICARE

## 2024-08-28 ENCOUNTER — DOCUMENTATION (OUTPATIENT)
Dept: PHYSICAL THERAPY | Facility: HOSPITAL | Age: 84
End: 2024-08-28
Payer: MEDICARE

## 2024-08-28 NOTE — PROGRESS NOTES
Physical Therapy    Discharge Summary    Name: Kate Kaye  MRN: [id number]  : 1940  Date: 24    Discharge Summary: PT    Discharge Information: Date of discharge 2024, Date of last visit 2024, Date of evaluation 7/10/2024, Number of attended visits 3, Referred by Dr. Goldstein, and Referred for frequent falls    Therapy Summary: Patient is an 83 year old female who presents to clinic with frequent falls. Patient's caregiver was present throughout the session to give a history. Patient is a poor historian. Patient was alert and oriented to her name only.     Discharge Status: Patient required max A for all transfers and gait. Patient demonstrated a posterior lean when sitting unsupported and when performing sit<->stand. Patient required tactile cues/assistance to initiate hip flexion to perform stand->sit transfers. Patient demonstrated improved posture once gait was initiated, but she reverted to a posterior lean once gait was completed. Patient demonstrated an inability to carryover instructions, requiring frequent cueing to perform the same activity multiple times. Seated reaching activities improved her sitting posture. Prior to activity patient demonstrated a posterior lean and was unable to place her elbows on her knees. Post reaching activity patient demonstrated an upright posture without posterior lean.      Rehab Discharge Reason: Other recent hospitalization